# Patient Record
Sex: MALE | Race: WHITE | Employment: UNEMPLOYED | URBAN - METROPOLITAN AREA
[De-identification: names, ages, dates, MRNs, and addresses within clinical notes are randomized per-mention and may not be internally consistent; named-entity substitution may affect disease eponyms.]

---

## 2019-06-21 ENCOUNTER — HOSPITAL ENCOUNTER (EMERGENCY)
Age: 34
Discharge: HOME | End: 2019-06-21
Attending: EMERGENCY MEDICINE
Payer: MEDICARE

## 2019-06-21 ENCOUNTER — APPOINTMENT (OUTPATIENT)
Dept: GENERAL RADIOLOGY | Age: 34
End: 2019-06-21
Payer: MEDICARE

## 2019-06-21 VITALS
HEART RATE: 91 BPM | HEIGHT: 69 IN | OXYGEN SATURATION: 98 % | BODY MASS INDEX: 30.51 KG/M2 | WEIGHT: 206 LBS | RESPIRATION RATE: 16 BRPM | TEMPERATURE: 98.2 F | SYSTOLIC BLOOD PRESSURE: 144 MMHG | DIASTOLIC BLOOD PRESSURE: 94 MMHG

## 2019-06-21 DIAGNOSIS — R42 LIGHTHEADEDNESS: Primary | ICD-10-CM

## 2019-06-21 PROCEDURE — 99283 EMERGENCY DEPT VISIT LOW MDM: CPT

## 2019-06-21 PROCEDURE — 71046 X-RAY EXAM CHEST 2 VIEWS: CPT

## 2019-06-21 ASSESSMENT — ENCOUNTER SYMPTOMS
COLOR CHANGE: 0
VOMITING: 0
WHEEZING: 0
NAUSEA: 0
RHINORRHEA: 0
SORE THROAT: 0
SHORTNESS OF BREATH: 0
DIARRHEA: 0
ABDOMINAL PAIN: 0
CONSTIPATION: 0
COUGH: 0
SINUS PRESSURE: 0

## 2019-06-21 ASSESSMENT — PAIN DESCRIPTION - PAIN TYPE: TYPE: ACUTE PAIN

## 2019-06-21 ASSESSMENT — PAIN SCALES - GENERAL: PAINLEVEL_OUTOF10: 7

## 2019-06-21 ASSESSMENT — PAIN DESCRIPTION - LOCATION: LOCATION: HEAD
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
## 2020-12-17 ENCOUNTER — HOSPITAL ENCOUNTER (EMERGENCY)
Facility: CLINIC | Age: 35
Discharge: HOME | End: 2020-12-17
Attending: EMERGENCY MEDICINE
Payer: COMMERCIAL

## 2020-12-17 VITALS
WEIGHT: 190 LBS | HEIGHT: 69 IN | DIASTOLIC BLOOD PRESSURE: 86 MMHG | OXYGEN SATURATION: 97 % | TEMPERATURE: 98.5 F | RESPIRATION RATE: 16 BRPM | SYSTOLIC BLOOD PRESSURE: 157 MMHG | HEART RATE: 100 BPM | BODY MASS INDEX: 28.14 KG/M2

## 2020-12-17 PROCEDURE — 99285 EMERGENCY DEPT VISIT HI MDM: CPT

## 2020-12-17 PROCEDURE — 6370000000 HC RX 637 (ALT 250 FOR IP): Performed by: EMERGENCY MEDICINE

## 2020-12-17 RX ORDER — ONDANSETRON 4 MG/1
4 TABLET, ORALLY DISINTEGRATING ORAL EVERY 8 HOURS PRN
Qty: 6 TABLET | Refills: 0 | Status: ON HOLD | OUTPATIENT
Start: 2020-12-17 | End: 2021-12-21 | Stop reason: SDUPTHER

## 2020-12-17 RX ORDER — ONDANSETRON 4 MG/1
4 TABLET, ORALLY DISINTEGRATING ORAL ONCE
Status: COMPLETED | OUTPATIENT
Start: 2020-12-17 | End: 2020-12-17

## 2020-12-17 RX ADMIN — ONDANSETRON 4 MG: 4 TABLET, ORALLY DISINTEGRATING ORAL at 23:14
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
## 2021-10-27 ENCOUNTER — HOSPITAL ENCOUNTER (EMERGENCY)
Facility: CLINIC | Age: 36
Discharge: LEFT BEFORE DISCHARGE | End: 2021-10-28
Attending: EMERGENCY MEDICINE
Payer: COMMERCIAL

## 2021-10-27 VITALS
HEIGHT: 68 IN | WEIGHT: 17 LBS | RESPIRATION RATE: 16 BRPM | OXYGEN SATURATION: 95 % | BODY MASS INDEX: 2.58 KG/M2 | HEART RATE: 125 BPM | SYSTOLIC BLOOD PRESSURE: 130 MMHG | DIASTOLIC BLOOD PRESSURE: 82 MMHG

## 2021-10-27 DIAGNOSIS — L03.119 CELLULITIS AND ABSCESS OF LEG: Primary | ICD-10-CM

## 2021-10-27 DIAGNOSIS — L02.419 CELLULITIS AND ABSCESS OF LEG: Primary | ICD-10-CM

## 2021-10-27 PROCEDURE — 83605 ASSAY OF LACTIC ACID: CPT

## 2021-10-27 PROCEDURE — 84484 ASSAY OF TROPONIN QUANT: CPT

## 2021-10-27 PROCEDURE — 83880 ASSAY OF NATRIURETIC PEPTIDE: CPT

## 2021-10-27 PROCEDURE — 99285 EMERGENCY DEPT VISIT HI MDM: CPT

## 2021-10-27 PROCEDURE — 83690 ASSAY OF LIPASE: CPT

## 2021-10-27 PROCEDURE — 80053 COMPREHEN METABOLIC PANEL: CPT

## 2021-10-27 PROCEDURE — 85025 COMPLETE CBC W/AUTO DIFF WBC: CPT

## 2021-10-27 PROCEDURE — 83735 ASSAY OF MAGNESIUM: CPT

## 2021-10-27 PROCEDURE — 36415 COLL VENOUS BLD VENIPUNCTURE: CPT

## 2021-10-27 PROCEDURE — 87040 BLOOD CULTURE FOR BACTERIA: CPT

## 2021-10-27 RX ORDER — METHADONE HYDROCHLORIDE 5 MG/5ML
100 SOLUTION ORAL DAILY
Status: ON HOLD | COMMUNITY
End: 2021-12-17 | Stop reason: ALTCHOICE

## 2021-10-27 RX ORDER — 0.9 % SODIUM CHLORIDE 0.9 %
1000 INTRAVENOUS SOLUTION INTRAVENOUS ONCE
Status: COMPLETED | OUTPATIENT
Start: 2021-10-28 | End: 2021-10-28

## 2021-10-27 RX ORDER — METHADONE HYDROCHLORIDE 40 MG/1
100 TABLET ORAL EVERY 4 HOURS PRN
Status: ON HOLD | COMMUNITY
End: 2021-12-17

## 2021-10-27 ASSESSMENT — PAIN DESCRIPTION - LOCATION: LOCATION: LEG

## 2021-10-27 ASSESSMENT — PAIN DESCRIPTION - DESCRIPTORS: DESCRIPTORS: ACHING

## 2021-10-27 ASSESSMENT — PAIN DESCRIPTION - FREQUENCY: FREQUENCY: CONTINUOUS

## 2021-10-27 ASSESSMENT — PAIN DESCRIPTION - ORIENTATION: ORIENTATION: LEFT

## 2021-10-27 ASSESSMENT — PAIN SCALES - GENERAL: PAINLEVEL_OUTOF10: 8

## 2021-10-28 ENCOUNTER — APPOINTMENT (OUTPATIENT)
Dept: CT IMAGING | Facility: CLINIC | Age: 36
End: 2021-10-28
Payer: COMMERCIAL

## 2021-10-28 LAB
ABSOLUTE EOS #: 0.3 K/UL (ref 0–0.4)
ABSOLUTE IMMATURE GRANULOCYTE: ABNORMAL K/UL (ref 0–0.3)
ABSOLUTE LYMPH #: 1.7 K/UL (ref 1–4.8)
ABSOLUTE MONO #: 0.7 K/UL (ref 0.1–1.2)
ALBUMIN SERPL-MCNC: 3.4 G/DL (ref 3.5–5.2)
ALBUMIN/GLOBULIN RATIO: 0.7 (ref 1–2.5)
ALP BLD-CCNC: 96 U/L (ref 40–129)
ALT SERPL-CCNC: 10 U/L (ref 5–41)
ANION GAP SERPL CALCULATED.3IONS-SCNC: 10 MMOL/L (ref 9–17)
AST SERPL-CCNC: 18 U/L
BASOPHILS # BLD: 2 % (ref 0–2)
BASOPHILS ABSOLUTE: 0.1 K/UL (ref 0–0.2)
BILIRUB SERPL-MCNC: 0.4 MG/DL (ref 0.3–1.2)
BNP INTERPRETATION: ABNORMAL
BUN BLDV-MCNC: 11 MG/DL (ref 6–20)
BUN/CREAT BLD: ABNORMAL (ref 9–20)
CALCIUM SERPL-MCNC: 8.7 MG/DL (ref 8.6–10.4)
CHLORIDE BLD-SCNC: 97 MMOL/L (ref 98–107)
CO2: 31 MMOL/L (ref 20–31)
CREAT SERPL-MCNC: 0.8 MG/DL (ref 0.7–1.2)
DIFFERENTIAL TYPE: ABNORMAL
EOSINOPHILS RELATIVE PERCENT: 4 % (ref 1–4)
GFR AFRICAN AMERICAN: >60 ML/MIN
GFR NON-AFRICAN AMERICAN: >60 ML/MIN
GFR SERPL CREATININE-BSD FRML MDRD: ABNORMAL ML/MIN/{1.73_M2}
GFR SERPL CREATININE-BSD FRML MDRD: ABNORMAL ML/MIN/{1.73_M2}
GLUCOSE BLD-MCNC: 119 MG/DL (ref 70–99)
HCT VFR BLD CALC: 26.2 % (ref 41–53)
HEMOGLOBIN: 8.1 G/DL (ref 13.5–17.5)
IMMATURE GRANULOCYTES: ABNORMAL %
LACTIC ACID, SEPSIS WHOLE BLOOD: NORMAL MMOL/L (ref 0.5–1.9)
LACTIC ACID, SEPSIS: 1 MMOL/L (ref 0.5–1.9)
LIPASE: 17 U/L (ref 13–60)
LYMPHOCYTES # BLD: 23 % (ref 24–44)
MAGNESIUM: 1.9 MG/DL (ref 1.6–2.6)
MCH RBC QN AUTO: 23.1 PG (ref 26–34)
MCHC RBC AUTO-ENTMCNC: 31.1 G/DL (ref 31–37)
MCV RBC AUTO: 74.3 FL (ref 80–100)
MONOCYTES # BLD: 9 % (ref 2–11)
NRBC AUTOMATED: ABNORMAL PER 100 WBC
PDW BLD-RTO: 19.3 % (ref 12.5–15.4)
PLATELET # BLD: 289 K/UL (ref 140–450)
PLATELET ESTIMATE: ABNORMAL
PMV BLD AUTO: 6.3 FL (ref 6–12)
POTASSIUM SERPL-SCNC: 4.2 MMOL/L (ref 3.7–5.3)
PRO-BNP: 1930 PG/ML
RBC # BLD: 3.52 M/UL (ref 4.5–5.9)
RBC # BLD: ABNORMAL 10*6/UL
SEG NEUTROPHILS: 62 % (ref 36–66)
SEGMENTED NEUTROPHILS ABSOLUTE COUNT: 4.6 K/UL (ref 1.8–7.7)
SODIUM BLD-SCNC: 138 MMOL/L (ref 135–144)
TOTAL PROTEIN: 8.2 G/DL (ref 6.4–8.3)
TROPONIN INTERP: ABNORMAL
TROPONIN T: ABNORMAL NG/ML
TROPONIN, HIGH SENSITIVITY: 37 NG/L (ref 0–22)
WBC # BLD: 7.4 K/UL (ref 3.5–11)
WBC # BLD: ABNORMAL 10*3/UL

## 2021-10-28 PROCEDURE — 2580000003 HC RX 258: Performed by: EMERGENCY MEDICINE

## 2021-10-28 PROCEDURE — 93005 ELECTROCARDIOGRAM TRACING: CPT | Performed by: EMERGENCY MEDICINE

## 2021-10-28 RX ORDER — 0.9 % SODIUM CHLORIDE 0.9 %
80 INTRAVENOUS SOLUTION INTRAVENOUS ONCE
Status: DISCONTINUED | OUTPATIENT
Start: 2021-10-28 | End: 2021-10-28 | Stop reason: HOSPADM

## 2021-10-28 RX ORDER — BUMETANIDE 1 MG/1
1 TABLET ORAL DAILY
Status: ON HOLD | COMMUNITY
End: 2021-12-21 | Stop reason: HOSPADM

## 2021-10-28 RX ORDER — SODIUM CHLORIDE 0.9 % (FLUSH) 0.9 %
10 SYRINGE (ML) INJECTION PRN
Status: DISCONTINUED | OUTPATIENT
Start: 2021-10-28 | End: 2021-10-28 | Stop reason: HOSPADM

## 2021-10-28 RX ADMIN — SODIUM CHLORIDE 1000 ML: 9 INJECTION, SOLUTION INTRAVENOUS at 00:19

## 2021-10-28 ASSESSMENT — ENCOUNTER SYMPTOMS
NAUSEA: 0
DIARRHEA: 0
SHORTNESS OF BREATH: 0
SORE THROAT: 0
ABDOMINAL PAIN: 0
VOMITING: 0

## 2021-10-28 NOTE — ED NOTES
Blood cultures drawn with IV start and sent to lab     Diana Leung, [facility]  10/28/21 5449
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
## 2021-10-28 NOTE — ED NOTES
Pt presents to the ED via private auto. Pt states his left leg has been swollen more than usual x1 week. Pt stated his left leg was painful for a few days but no longer hurts.  Pt states he normally is short of breath but he noticed he is more short of breath with exertion lately        Jacklyn Diaz, RN  10/28/21 79 Jonathon Parker RN  10/28/21 0030

## 2021-10-30 LAB
EKG ATRIAL RATE: 107 BPM
EKG P AXIS: 45 DEGREES
EKG P-R INTERVAL: 150 MS
EKG Q-T INTERVAL: 374 MS
EKG QRS DURATION: 90 MS
EKG QTC CALCULATION (BAZETT): 499 MS
EKG R AXIS: 4 DEGREES
EKG T AXIS: -30 DEGREES
EKG VENTRICULAR RATE: 107 BPM

## 2021-11-03 LAB
CULTURE: NORMAL
Lab: NORMAL
SPECIMEN DESCRIPTION: NORMAL

## 2021-12-16 ENCOUNTER — HOSPITAL ENCOUNTER (INPATIENT)
Age: 36
LOS: 5 days | Discharge: HOME | DRG: 317 | End: 2021-12-21
Attending: EMERGENCY MEDICINE | Admitting: INTERNAL MEDICINE
Payer: COMMERCIAL

## 2021-12-16 ENCOUNTER — APPOINTMENT (OUTPATIENT)
Dept: GENERAL RADIOLOGY | Age: 36
DRG: 317 | End: 2021-12-16
Payer: COMMERCIAL

## 2021-12-16 DIAGNOSIS — M86.272 SUBACUTE OSTEOMYELITIS OF LEFT FOOT (HCC): Primary | ICD-10-CM

## 2021-12-16 DIAGNOSIS — M86.9 OSTEOMYELITIS OF LEFT FOOT, UNSPECIFIED TYPE (HCC): ICD-10-CM

## 2021-12-16 PROBLEM — F11.20 METHADONE DEPENDENCE (HCC): Status: ACTIVE | Noted: 2021-12-16

## 2021-12-16 PROBLEM — I26.99 PULMONARY EMBOLISM (HCC): Status: ACTIVE | Noted: 2021-12-16

## 2021-12-16 LAB
ABSOLUTE EOS #: 0.18 K/UL (ref 0–0.44)
ABSOLUTE IMMATURE GRANULOCYTE: <0.03 K/UL (ref 0–0.3)
ABSOLUTE LYMPH #: 1.4 K/UL (ref 1.1–3.7)
ABSOLUTE MONO #: 0.42 K/UL (ref 0.1–1.2)
ALBUMIN SERPL-MCNC: 3.6 G/DL (ref 3.5–5.2)
ALBUMIN/GLOBULIN RATIO: 0.9 (ref 1–2.5)
ALP BLD-CCNC: 105 U/L (ref 40–129)
ALT SERPL-CCNC: 28 U/L (ref 5–41)
ANION GAP SERPL CALCULATED.3IONS-SCNC: 9 MMOL/L (ref 9–17)
AST SERPL-CCNC: 30 U/L
BASOPHILS # BLD: 1 % (ref 0–2)
BASOPHILS ABSOLUTE: 0.05 K/UL (ref 0–0.2)
BILIRUB SERPL-MCNC: 0.51 MG/DL (ref 0.3–1.2)
BNP INTERPRETATION: ABNORMAL
BUN BLDV-MCNC: 8 MG/DL (ref 6–20)
BUN/CREAT BLD: ABNORMAL (ref 9–20)
C-REACTIVE PROTEIN: 16 MG/L (ref 0–5)
CALCIUM SERPL-MCNC: 9.1 MG/DL (ref 8.6–10.4)
CHLORIDE BLD-SCNC: 100 MMOL/L (ref 98–107)
CO2: 27 MMOL/L (ref 20–31)
CREAT SERPL-MCNC: 0.49 MG/DL (ref 0.7–1.2)
DIFFERENTIAL TYPE: ABNORMAL
EOSINOPHILS RELATIVE PERCENT: 3 % (ref 1–4)
GFR AFRICAN AMERICAN: >60 ML/MIN
GFR NON-AFRICAN AMERICAN: >60 ML/MIN
GFR SERPL CREATININE-BSD FRML MDRD: ABNORMAL ML/MIN/{1.73_M2}
GFR SERPL CREATININE-BSD FRML MDRD: ABNORMAL ML/MIN/{1.73_M2}
GLUCOSE BLD-MCNC: 100 MG/DL (ref 70–99)
HCT VFR BLD CALC: 37 % (ref 40.7–50.3)
HEMOGLOBIN: 10.5 G/DL (ref 13–17)
IMMATURE GRANULOCYTES: 0 %
INR BLD: 1.1
LACTIC ACID, WHOLE BLOOD: 1.2 MMOL/L (ref 0.7–2.1)
LACTIC ACID: NORMAL MMOL/L
LYMPHOCYTES # BLD: 26 % (ref 24–43)
MCH RBC QN AUTO: 20.1 PG (ref 25.2–33.5)
MCHC RBC AUTO-ENTMCNC: 28.4 G/DL (ref 28.4–34.8)
MCV RBC AUTO: 70.9 FL (ref 82.6–102.9)
MONOCYTES # BLD: 8 % (ref 3–12)
NRBC AUTOMATED: 0 PER 100 WBC
PARTIAL THROMBOPLASTIN TIME: 24.1 SEC (ref 20.5–30.5)
PDW BLD-RTO: 16.7 % (ref 11.8–14.4)
PLATELET # BLD: 210 K/UL (ref 138–453)
PLATELET ESTIMATE: ABNORMAL
PMV BLD AUTO: 8.9 FL (ref 8.1–13.5)
POTASSIUM SERPL-SCNC: 4.2 MMOL/L (ref 3.7–5.3)
PRO-BNP: 400 PG/ML
PROTHROMBIN TIME: 11.3 SEC (ref 9.1–12.3)
RBC # BLD: 5.22 M/UL (ref 4.21–5.77)
RBC # BLD: ABNORMAL 10*6/UL
SARS-COV-2, RAPID: NOT DETECTED
SEDIMENTATION RATE, ERYTHROCYTE: 31 MM (ref 0–15)
SEG NEUTROPHILS: 62 % (ref 36–65)
SEGMENTED NEUTROPHILS ABSOLUTE COUNT: 3.31 K/UL (ref 1.5–8.1)
SODIUM BLD-SCNC: 136 MMOL/L (ref 135–144)
SPECIMEN DESCRIPTION: NORMAL
TOTAL PROTEIN: 7.6 G/DL (ref 6.4–8.3)
TROPONIN INTERP: NORMAL
TROPONIN INTERP: NORMAL
TROPONIN T: NORMAL NG/ML
TROPONIN T: NORMAL NG/ML
TROPONIN, HIGH SENSITIVITY: 6 NG/L (ref 0–22)
TROPONIN, HIGH SENSITIVITY: 8 NG/L (ref 0–22)
WBC # BLD: 5.4 K/UL (ref 3.5–11.3)
WBC # BLD: ABNORMAL 10*3/UL

## 2021-12-16 PROCEDURE — 6360000002 HC RX W HCPCS: Performed by: STUDENT IN AN ORGANIZED HEALTH CARE EDUCATION/TRAINING PROGRAM

## 2021-12-16 PROCEDURE — 6370000000 HC RX 637 (ALT 250 FOR IP): Performed by: NURSE PRACTITIONER

## 2021-12-16 PROCEDURE — 80053 COMPREHEN METABOLIC PANEL: CPT

## 2021-12-16 PROCEDURE — 1200000000 HC SEMI PRIVATE

## 2021-12-16 PROCEDURE — 93005 ELECTROCARDIOGRAM TRACING: CPT | Performed by: STUDENT IN AN ORGANIZED HEALTH CARE EDUCATION/TRAINING PROGRAM

## 2021-12-16 PROCEDURE — 73610 X-RAY EXAM OF ANKLE: CPT

## 2021-12-16 PROCEDURE — 73630 X-RAY EXAM OF FOOT: CPT

## 2021-12-16 PROCEDURE — 83605 ASSAY OF LACTIC ACID: CPT

## 2021-12-16 PROCEDURE — 85652 RBC SED RATE AUTOMATED: CPT

## 2021-12-16 PROCEDURE — 85025 COMPLETE CBC W/AUTO DIFF WBC: CPT

## 2021-12-16 PROCEDURE — 99222 1ST HOSP IP/OBS MODERATE 55: CPT | Performed by: NURSE PRACTITIONER

## 2021-12-16 PROCEDURE — 73590 X-RAY EXAM OF LOWER LEG: CPT

## 2021-12-16 PROCEDURE — 71045 X-RAY EXAM CHEST 1 VIEW: CPT

## 2021-12-16 PROCEDURE — 86140 C-REACTIVE PROTEIN: CPT

## 2021-12-16 PROCEDURE — 87635 SARS-COV-2 COVID-19 AMP PRB: CPT

## 2021-12-16 PROCEDURE — 87040 BLOOD CULTURE FOR BACTERIA: CPT

## 2021-12-16 PROCEDURE — 2500000003 HC RX 250 WO HCPCS: Performed by: NURSE PRACTITIONER

## 2021-12-16 PROCEDURE — 96374 THER/PROPH/DIAG INJ IV PUSH: CPT

## 2021-12-16 PROCEDURE — 2580000003 HC RX 258: Performed by: NURSE PRACTITIONER

## 2021-12-16 PROCEDURE — 6360000002 HC RX W HCPCS: Performed by: NURSE PRACTITIONER

## 2021-12-16 PROCEDURE — 85730 THROMBOPLASTIN TIME PARTIAL: CPT

## 2021-12-16 PROCEDURE — 83880 ASSAY OF NATRIURETIC PEPTIDE: CPT

## 2021-12-16 PROCEDURE — 85610 PROTHROMBIN TIME: CPT

## 2021-12-16 PROCEDURE — 84484 ASSAY OF TROPONIN QUANT: CPT

## 2021-12-16 PROCEDURE — 99283 EMERGENCY DEPT VISIT LOW MDM: CPT

## 2021-12-16 PROCEDURE — 36415 COLL VENOUS BLD VENIPUNCTURE: CPT

## 2021-12-16 RX ORDER — BACLOFEN 10 MG/1
20 TABLET ORAL 3 TIMES DAILY
Status: DISCONTINUED | OUTPATIENT
Start: 2021-12-16 | End: 2021-12-21 | Stop reason: HOSPADM

## 2021-12-16 RX ORDER — SODIUM CHLORIDE 0.9 % (FLUSH) 0.9 %
10 SYRINGE (ML) INJECTION PRN
Status: DISCONTINUED | OUTPATIENT
Start: 2021-12-16 | End: 2021-12-21 | Stop reason: HOSPADM

## 2021-12-16 RX ORDER — ACETAMINOPHEN 325 MG/1
650 TABLET ORAL EVERY 6 HOURS PRN
Status: DISCONTINUED | OUTPATIENT
Start: 2021-12-16 | End: 2021-12-21 | Stop reason: HOSPADM

## 2021-12-16 RX ORDER — POLYETHYLENE GLYCOL 3350 17 G/17G
17 POWDER, FOR SOLUTION ORAL DAILY PRN
Status: DISCONTINUED | OUTPATIENT
Start: 2021-12-16 | End: 2021-12-21 | Stop reason: HOSPADM

## 2021-12-16 RX ORDER — GABAPENTIN 800 MG/1
800 TABLET ORAL 3 TIMES DAILY
Status: DISCONTINUED | OUTPATIENT
Start: 2021-12-17 | End: 2021-12-21 | Stop reason: HOSPADM

## 2021-12-16 RX ORDER — POTASSIUM CHLORIDE 20 MEQ/1
40 TABLET, EXTENDED RELEASE ORAL PRN
Status: DISCONTINUED | OUTPATIENT
Start: 2021-12-16 | End: 2021-12-21 | Stop reason: HOSPADM

## 2021-12-16 RX ORDER — POTASSIUM CHLORIDE 7.45 MG/ML
10 INJECTION INTRAVENOUS PRN
Status: DISCONTINUED | OUTPATIENT
Start: 2021-12-16 | End: 2021-12-21 | Stop reason: HOSPADM

## 2021-12-16 RX ORDER — KETOROLAC TROMETHAMINE 30 MG/ML
30 INJECTION, SOLUTION INTRAMUSCULAR; INTRAVENOUS EVERY 6 HOURS PRN
Status: DISCONTINUED | OUTPATIENT
Start: 2021-12-17 | End: 2021-12-17

## 2021-12-16 RX ORDER — MAGNESIUM SULFATE 1 G/100ML
1000 INJECTION INTRAVENOUS PRN
Status: DISCONTINUED | OUTPATIENT
Start: 2021-12-16 | End: 2021-12-21 | Stop reason: HOSPADM

## 2021-12-16 RX ORDER — GABAPENTIN 800 MG/1
400 TABLET ORAL 3 TIMES DAILY
Status: DISCONTINUED | OUTPATIENT
Start: 2021-12-16 | End: 2021-12-16

## 2021-12-16 RX ORDER — ACETAMINOPHEN 650 MG/1
650 SUPPOSITORY RECTAL EVERY 6 HOURS PRN
Status: DISCONTINUED | OUTPATIENT
Start: 2021-12-16 | End: 2021-12-21 | Stop reason: HOSPADM

## 2021-12-16 RX ORDER — GABAPENTIN 400 MG/1
400 CAPSULE ORAL ONCE
Status: COMPLETED | OUTPATIENT
Start: 2021-12-16 | End: 2021-12-16

## 2021-12-16 RX ORDER — SODIUM CHLORIDE 0.9 % (FLUSH) 0.9 %
5-40 SYRINGE (ML) INJECTION EVERY 12 HOURS SCHEDULED
Status: DISCONTINUED | OUTPATIENT
Start: 2021-12-16 | End: 2021-12-21 | Stop reason: HOSPADM

## 2021-12-16 RX ORDER — SODIUM CHLORIDE 9 MG/ML
25 INJECTION, SOLUTION INTRAVENOUS PRN
Status: DISCONTINUED | OUTPATIENT
Start: 2021-12-16 | End: 2021-12-21 | Stop reason: HOSPADM

## 2021-12-16 RX ORDER — ONDANSETRON 2 MG/ML
4 INJECTION INTRAMUSCULAR; INTRAVENOUS EVERY 6 HOURS PRN
Status: DISCONTINUED | OUTPATIENT
Start: 2021-12-16 | End: 2021-12-21 | Stop reason: HOSPADM

## 2021-12-16 RX ORDER — KETOROLAC TROMETHAMINE 30 MG/ML
30 INJECTION, SOLUTION INTRAMUSCULAR; INTRAVENOUS ONCE
Status: COMPLETED | OUTPATIENT
Start: 2021-12-16 | End: 2021-12-16

## 2021-12-16 RX ORDER — ONDANSETRON 4 MG/1
4 TABLET, ORALLY DISINTEGRATING ORAL EVERY 8 HOURS PRN
Status: DISCONTINUED | OUTPATIENT
Start: 2021-12-16 | End: 2021-12-21 | Stop reason: HOSPADM

## 2021-12-16 RX ADMIN — BACLOFEN 20 MG: 10 TABLET ORAL at 23:27

## 2021-12-16 RX ADMIN — FAMOTIDINE 20 MG: 10 INJECTION INTRAVENOUS at 21:13

## 2021-12-16 RX ADMIN — GABAPENTIN 400 MG: 800 TABLET ORAL at 21:13

## 2021-12-16 RX ADMIN — KETOROLAC TROMETHAMINE 30 MG: 30 INJECTION, SOLUTION INTRAMUSCULAR; INTRAVENOUS at 18:25

## 2021-12-16 RX ADMIN — GABAPENTIN 400 MG: 400 CAPSULE ORAL at 23:27

## 2021-12-16 RX ADMIN — SODIUM CHLORIDE, PRESERVATIVE FREE 10 ML: 5 INJECTION INTRAVENOUS at 21:17

## 2021-12-16 RX ADMIN — Medication 1250 MG: at 17:51

## 2021-12-16 ASSESSMENT — ENCOUNTER SYMPTOMS
COUGH: 0
NAUSEA: 0
ABDOMINAL PAIN: 0
VOMITING: 0
PHOTOPHOBIA: 0
BACK PAIN: 0
DIARRHEA: 0
SHORTNESS OF BREATH: 0
WHEEZING: 0
SORE THROAT: 0
CONSTIPATION: 0
SINUS PAIN: 0

## 2021-12-16 ASSESSMENT — PAIN DESCRIPTION - DESCRIPTORS
DESCRIPTORS: THROBBING
DESCRIPTORS: THROBBING;CONSTANT

## 2021-12-16 ASSESSMENT — PAIN SCALES - GENERAL
PAINLEVEL_OUTOF10: 8
PAINLEVEL_OUTOF10: 8
PAINLEVEL_OUTOF10: 0
PAINLEVEL_OUTOF10: 10

## 2021-12-16 ASSESSMENT — PAIN DESCRIPTION - FREQUENCY
FREQUENCY: CONTINUOUS
FREQUENCY: INTERMITTENT

## 2021-12-16 ASSESSMENT — PAIN DESCRIPTION - PAIN TYPE: TYPE: ACUTE PAIN

## 2021-12-16 ASSESSMENT — PAIN DESCRIPTION - ORIENTATION
ORIENTATION: LEFT
ORIENTATION: LEFT

## 2021-12-16 ASSESSMENT — PAIN DESCRIPTION - LOCATION
LOCATION: FOOT
LOCATION: LEG

## 2021-12-16 NOTE — ED PROVIDER NOTES
[address] ED  Faculty Attestation    I performed a history and physical examination of the patient and discussed management with the resident. I reviewed the residents note and agree with the documented findings and plan of care. Any areas of disagreement are noted on the chart. I was personally present for the key portions of any procedures. I have documented in the chart those procedures where I was not present during the key portions. I have reviewed the emergency nurses triage note. I agree with the chief complaint, past medical history, past surgical history, allergies, medications, social, and family history as documented unless otherwise noted below.        This is a 79-year-old male brought to the emergency department for medical clearance for FDC  Of note patient was at [facility] yesterday  Review of records indicate that he was diagnosed with a left lower extremity cellulitis, a left lower extremity abscess, and pulmonary emboli  Patient reportedly does have a history of emboli but is not on anticoagulation currently  Has had recent fatigue, intermittent nausea, and shortness of breath  He is not actively shortness of breath or dyspneic  No cough or wheezing  Denies fever  No chest pain, palpitations, or near syncope  No abdominal discomfort  No genitourinary or stool complaints  Review of systems otherwise negative  He has no additional concerns    On examination he appears in no acute distress  Heart is regular in rate and rhythm  Systolic murmur present (patient post valve replacement)  Lungs clear to auscultation  Normal work of breathing  No retractions  Abdomen soft and nontender  Normal peripheral perfusion and skin turgor  Patient does have bilateral lower extremity edema that is worse on the left  Patient with some questionable fluctuance to the left posterior lower leg  No open wound  No bruising  Normal peripheral perfusion and sensation    Charts, labs, and imaging from [facility] reviewed    Will start IV antibiotics, repeat labs, and plan for admission    Imaging reviewed  Stable vital signs  We will plan for admission  We will discuss anticoagulation with admitting team      Racehl Sahu DO  Attending Emergency Physician        Clearance DO Cece  12/16/21 [id number]
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
## 2021-12-16 NOTE — ED NOTES
Pt was seen at [address] [facility] 12/15/21 for blood clots in left leg  Pt left AMA from [address] [facility]   Pt states he had heart  valve replacement in July 2021  Pt has a history of drug abuse  Pt states he used cocaine 3 days ago     Ap Ramirez, LPN  31/85/07 [facility], [facility]  56/43/08 [id number]

## 2021-12-16 NOTE — ED NOTES
Bed: 44  Expected date:   Expected time:   Means of arrival:   Comments:     Mortimer Knee, RN  12/16/21 [id number]

## 2021-12-17 ENCOUNTER — APPOINTMENT (OUTPATIENT)
Dept: MRI IMAGING | Age: 36
DRG: 317 | End: 2021-12-17
Payer: COMMERCIAL

## 2021-12-17 PROBLEM — D50.9 MICROCYTIC ANEMIA: Status: ACTIVE | Noted: 2021-12-17

## 2021-12-17 LAB
-: ABNORMAL
AMORPHOUS: ABNORMAL
ANION GAP SERPL CALCULATED.3IONS-SCNC: 9 MMOL/L (ref 9–17)
BACTERIA: ABNORMAL
BILIRUBIN URINE: NEGATIVE
BUN BLDV-MCNC: 10 MG/DL (ref 6–20)
BUN/CREAT BLD: ABNORMAL (ref 9–20)
CALCIUM SERPL-MCNC: 8.7 MG/DL (ref 8.6–10.4)
CASTS UA: ABNORMAL /LPF (ref 0–8)
CHLORIDE BLD-SCNC: 103 MMOL/L (ref 98–107)
CO2: 26 MMOL/L (ref 20–31)
COLOR: YELLOW
CREAT SERPL-MCNC: 0.58 MG/DL (ref 0.7–1.2)
CRYSTALS, UA: ABNORMAL /HPF
EKG ATRIAL RATE: 93 BPM
EKG ATRIAL RATE: 95 BPM
EKG P AXIS: 65 DEGREES
EKG P AXIS: 67 DEGREES
EKG P-R INTERVAL: 152 MS
EKG P-R INTERVAL: 154 MS
EKG Q-T INTERVAL: 378 MS
EKG Q-T INTERVAL: 384 MS
EKG QRS DURATION: 92 MS
EKG QRS DURATION: 92 MS
EKG QTC CALCULATION (BAZETT): 475 MS
EKG QTC CALCULATION (BAZETT): 477 MS
EKG R AXIS: 19 DEGREES
EKG R AXIS: 23 DEGREES
EKG T AXIS: 44 DEGREES
EKG T AXIS: 45 DEGREES
EKG VENTRICULAR RATE: 93 BPM
EKG VENTRICULAR RATE: 95 BPM
EPITHELIAL CELLS UA: ABNORMAL /HPF (ref 0–5)
FERRITIN: 96 UG/L (ref 30–400)
FOLATE: 14 NG/ML
GFR AFRICAN AMERICAN: >60 ML/MIN
GFR NON-AFRICAN AMERICAN: >60 ML/MIN
GFR SERPL CREATININE-BSD FRML MDRD: ABNORMAL ML/MIN/{1.73_M2}
GFR SERPL CREATININE-BSD FRML MDRD: ABNORMAL ML/MIN/{1.73_M2}
GLUCOSE BLD-MCNC: 101 MG/DL (ref 70–99)
GLUCOSE URINE: NEGATIVE
HCT VFR BLD CALC: 36.7 % (ref 40.7–50.3)
HEMOGLOBIN: 10.8 G/DL (ref 13–17)
IRON SATURATION: 9 % (ref 20–55)
IRON: 24 UG/DL (ref 59–158)
KETONES, URINE: NEGATIVE
LEUKOCYTE ESTERASE, URINE: NEGATIVE
MCH RBC QN AUTO: 20.8 PG (ref 25.2–33.5)
MCHC RBC AUTO-ENTMCNC: 29.4 G/DL (ref 28.4–34.8)
MCV RBC AUTO: 70.6 FL (ref 82.6–102.9)
MUCUS: ABNORMAL
NITRITE, URINE: NEGATIVE
NRBC AUTOMATED: 0 PER 100 WBC
OTHER OBSERVATIONS UA: ABNORMAL
PDW BLD-RTO: 16.8 % (ref 11.8–14.4)
PH UA: 8.5 (ref 5–8)
PLATELET # BLD: 199 K/UL (ref 138–453)
PMV BLD AUTO: 8.6 FL (ref 8.1–13.5)
POTASSIUM SERPL-SCNC: 4.3 MMOL/L (ref 3.7–5.3)
PROTEIN UA: NEGATIVE
RBC # BLD: 5.2 M/UL (ref 4.21–5.77)
RBC UA: ABNORMAL /HPF (ref 0–4)
RENAL EPITHELIAL, UA: ABNORMAL /HPF
SODIUM BLD-SCNC: 138 MMOL/L (ref 135–144)
SPECIFIC GRAVITY UA: 1.01 (ref 1–1.03)
TOTAL IRON BINDING CAPACITY: 280 UG/DL (ref 250–450)
TRICHOMONAS: ABNORMAL
TURBIDITY: CLEAR
UNSATURATED IRON BINDING CAPACITY: 256 UG/DL (ref 112–347)
URINE HGB: NEGATIVE
UROBILINOGEN, URINE: NORMAL
VITAMIN B-12: 476 PG/ML (ref 232–1245)
WBC # BLD: 5.5 K/UL (ref 3.5–11.3)
WBC UA: ABNORMAL /HPF (ref 0–5)
YEAST: ABNORMAL

## 2021-12-17 PROCEDURE — APPSS30 APP SPLIT SHARED TIME 16-30 MINUTES: Performed by: NURSE PRACTITIONER

## 2021-12-17 PROCEDURE — 73720 MRI LWR EXTREMITY W/O&W/DYE: CPT

## 2021-12-17 PROCEDURE — 99254 IP/OBS CNSLTJ NEW/EST MOD 60: CPT | Performed by: INTERNAL MEDICINE

## 2021-12-17 PROCEDURE — 36415 COLL VENOUS BLD VENIPUNCTURE: CPT

## 2021-12-17 PROCEDURE — 80048 BASIC METABOLIC PNL TOTAL CA: CPT

## 2021-12-17 PROCEDURE — 82746 ASSAY OF FOLIC ACID SERUM: CPT

## 2021-12-17 PROCEDURE — 2500000003 HC RX 250 WO HCPCS: Performed by: NURSE PRACTITIONER

## 2021-12-17 PROCEDURE — 83540 ASSAY OF IRON: CPT

## 2021-12-17 PROCEDURE — 6360000004 HC RX CONTRAST MEDICATION: Performed by: NURSE PRACTITIONER

## 2021-12-17 PROCEDURE — A9576 INJ PROHANCE MULTIPACK: HCPCS | Performed by: NURSE PRACTITIONER

## 2021-12-17 PROCEDURE — 83550 IRON BINDING TEST: CPT

## 2021-12-17 PROCEDURE — 6360000002 HC RX W HCPCS: Performed by: INTERNAL MEDICINE

## 2021-12-17 PROCEDURE — 81001 URINALYSIS AUTO W/SCOPE: CPT

## 2021-12-17 PROCEDURE — 1200000000 HC SEMI PRIVATE

## 2021-12-17 PROCEDURE — 2580000003 HC RX 258: Performed by: NURSE PRACTITIONER

## 2021-12-17 PROCEDURE — 82728 ASSAY OF FERRITIN: CPT

## 2021-12-17 PROCEDURE — 6360000002 HC RX W HCPCS: Performed by: NURSE PRACTITIONER

## 2021-12-17 PROCEDURE — 6370000000 HC RX 637 (ALT 250 FOR IP): Performed by: INTERNAL MEDICINE

## 2021-12-17 PROCEDURE — 87086 URINE CULTURE/COLONY COUNT: CPT

## 2021-12-17 PROCEDURE — 6370000000 HC RX 637 (ALT 250 FOR IP): Performed by: NURSE PRACTITIONER

## 2021-12-17 PROCEDURE — 82607 VITAMIN B-12: CPT

## 2021-12-17 PROCEDURE — 99232 SBSQ HOSP IP/OBS MODERATE 35: CPT | Performed by: INTERNAL MEDICINE

## 2021-12-17 PROCEDURE — 87641 MR-STAPH DNA AMP PROBE: CPT

## 2021-12-17 PROCEDURE — 85027 COMPLETE CBC AUTOMATED: CPT

## 2021-12-17 PROCEDURE — 93010 ELECTROCARDIOGRAM REPORT: CPT | Performed by: INTERNAL MEDICINE

## 2021-12-17 RX ORDER — METHADONE HYDROCHLORIDE 5 MG/5ML
100 SOLUTION ORAL DAILY
Status: DISCONTINUED | OUTPATIENT
Start: 2021-12-17 | End: 2021-12-21 | Stop reason: HOSPADM

## 2021-12-17 RX ORDER — VENLAFAXINE HYDROCHLORIDE 75 MG/1
75 CAPSULE, EXTENDED RELEASE ORAL
Status: DISCONTINUED | OUTPATIENT
Start: 2021-12-18 | End: 2021-12-21 | Stop reason: HOSPADM

## 2021-12-17 RX ORDER — SODIUM CHLORIDE 0.9 % (FLUSH) 0.9 %
10 SYRINGE (ML) INJECTION PRN
Status: DISCONTINUED | OUTPATIENT
Start: 2021-12-17 | End: 2021-12-21 | Stop reason: HOSPADM

## 2021-12-17 RX ORDER — LORAZEPAM 2 MG/ML
1 INJECTION INTRAMUSCULAR ONCE
Status: COMPLETED | OUTPATIENT
Start: 2021-12-17 | End: 2021-12-17

## 2021-12-17 RX ADMIN — GABAPENTIN 800 MG: 800 TABLET ORAL at 13:13

## 2021-12-17 RX ADMIN — LORAZEPAM 1 MG: 2 INJECTION INTRAMUSCULAR; INTRAVENOUS at 14:13

## 2021-12-17 RX ADMIN — ENOXAPARIN SODIUM 80 MG: 100 INJECTION SUBCUTANEOUS at 21:38

## 2021-12-17 RX ADMIN — FAMOTIDINE 20 MG: 10 INJECTION INTRAVENOUS at 21:38

## 2021-12-17 RX ADMIN — Medication 1250 MG: at 01:08

## 2021-12-17 RX ADMIN — FAMOTIDINE 20 MG: 10 INJECTION INTRAVENOUS at 08:52

## 2021-12-17 RX ADMIN — METHADONE HYDROCHLORIDE 100 MG: 5 SOLUTION ORAL at 12:11

## 2021-12-17 RX ADMIN — ENOXAPARIN SODIUM 80 MG: 100 INJECTION SUBCUTANEOUS at 08:52

## 2021-12-17 RX ADMIN — GADOTERIDOL 16 ML: 279.3 INJECTION, SOLUTION INTRAVENOUS at 15:15

## 2021-12-17 RX ADMIN — BACLOFEN 20 MG: 10 TABLET ORAL at 08:53

## 2021-12-17 RX ADMIN — GABAPENTIN 800 MG: 800 TABLET ORAL at 08:53

## 2021-12-17 RX ADMIN — SODIUM CHLORIDE, PRESERVATIVE FREE 10 ML: 5 INJECTION INTRAVENOUS at 08:53

## 2021-12-17 RX ADMIN — ACETAMINOPHEN 650 MG: 325 TABLET ORAL at 07:55

## 2021-12-17 RX ADMIN — BACLOFEN 20 MG: 10 TABLET ORAL at 13:13

## 2021-12-17 RX ADMIN — Medication 1250 MG: at 16:00

## 2021-12-17 RX ADMIN — GABAPENTIN 800 MG: 800 TABLET ORAL at 21:38

## 2021-12-17 RX ADMIN — BACLOFEN 20 MG: 10 TABLET ORAL at 21:38

## 2021-12-17 RX ADMIN — Medication 1250 MG: at 08:53

## 2021-12-17 ASSESSMENT — PAIN DESCRIPTION - LOCATION: LOCATION: FOOT

## 2021-12-17 ASSESSMENT — PAIN DESCRIPTION - ORIENTATION: ORIENTATION: LEFT

## 2021-12-17 ASSESSMENT — PAIN SCALES - GENERAL
PAINLEVEL_OUTOF10: 3
PAINLEVEL_OUTOF10: 0
PAINLEVEL_OUTOF10: 3
PAINLEVEL_OUTOF10: 10

## 2021-12-17 ASSESSMENT — PAIN DESCRIPTION - DESCRIPTORS: DESCRIPTORS: THROBBING;ACHING

## 2021-12-17 ASSESSMENT — ENCOUNTER SYMPTOMS
SORE THROAT: 0
VOMITING: 0
SHORTNESS OF BREATH: 0
COUGH: 0
DIARRHEA: 0
NAUSEA: 0
COLOR CHANGE: 0

## 2021-12-17 ASSESSMENT — PAIN DESCRIPTION - ONSET: ONSET: ON-GOING

## 2021-12-17 ASSESSMENT — PAIN DESCRIPTION - FREQUENCY: FREQUENCY: INTERMITTENT

## 2021-12-17 ASSESSMENT — PAIN DESCRIPTION - PAIN TYPE: TYPE: ACUTE PAIN
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
## 2021-12-17 NOTE — CARE COORDINATION
Case Management Initial Discharge Plan  Lori Lebron,             Met with:patient and [facility] [place] to discuss discharge plans. Information verified: address, contacts, phone number, , insurance Yes  Insurance Provider: [facility]    Emergency Contact/Next of Kin name & number: Blanca Malave, pt's grandfather and deacon at [phone]  Who are involved in patient's support system? Grandfather    PCP: No primary care provider on file. Date of last visit: none      Discharge Planning    Living Arrangements:  Alone     Home has 1 stories  0 stairs to climb to get into front door,   Location of bedroom/bathroom in home main    Patient able to perform ADL's:Independent    Current Services (outpatient & in home) none  DME equipment: cane  DME provider: n/a    Is patient receiving oral anticoagulation therapy? No    If indicated:   Physician managing anticoagulation treatment: n/a  Where does patient obtain lab work for ATC treatment? n/a      Potential Assistance Needed:       Patient agreeable to home care: No  [place] of choice provided:  no    Prior SNF/Rehab Placement and Facility: [facility] 4 months ago  Agreeable to SNF/Rehab: n/a  [place] of choice provided: no     Evaluation: no    Expected Discharge date:       Patient expects to be discharged to: If home: is the family and/or caregiver wiling & able to provide support at home? non3  Who will be providing this support? none    Follow Up Appointment: Best Day/ Time:      Transportation provider: Police  Transportation arrangements needed for discharge: No    Readmission Risk              Risk of Unplanned Readmission:  17             Does patient have a readmission risk score greater than 14?: Yes  If yes, follow-up appointment must be made within 7 days of discharge.      Goals of Care:       Educated pt and [facility] [place] on transitional options, provided freedom of choice and are agreeable with plan      Discharge Plan: Go to [facility] senior care when discharged.            Electronically signed by Su Acuña RN on 12/17/21 at 6:03 PM EST
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
## 2021-12-17 NOTE — PROGRESS NOTES
Physical Therapy        Physical Therapy Cancel Note      DATE: 2021    NAME: Liya Ballesteros  MRN: [id number]   : 1985      Patient not seen this date for Physical Therapy due to:    Testing: awaiting MRI results with podiatry POC. PT will continue to follow for needs during admission.        Electronically signed by Yakelin Simms PT on 2021 at 10:47 AM

## 2021-12-17 NOTE — ED NOTES
Report called to room 234, pt tranpsorted, luz answered, pt under sherri custody          Ryan Rucker RN  12/16/21 [id number]

## 2021-12-17 NOTE — PROGRESS NOTES
confirmed dose of methadone with [facility] he take 100mg of methadone liquid everyday last dose dec 15 at 837am notified dr yuli eubanks serve

## 2021-12-17 NOTE — PROGRESS NOTES
Tried to call for methadone dose at [facility] talked with them need release of information faxed to them waiting for call back.

## 2021-12-17 NOTE — PROGRESS NOTES
Received call from Microbiology to send a different sample to run the MRSA testing as the specimen that was sent has the gel in the bottom and will not be able to perform the test.    Bharti Razo will send the correct swab to us for the MRSA nasal  Test.    Primary nurse, Chino Dumont, notified of above.

## 2021-12-17 NOTE — PROGRESS NOTES
[facility] ([facility])  Occupational Therapy Not Seen Note    DATE: 2021    NAME: Alyce Vasquez  MRN: [id number]   : 1985      Patient not seen this date for Occupational Therapy due to:     Other: Await podiatry consult for POC     Next Scheduled Treatment: Ck in pm or      Electronically signed by Medhat Hensley OT on 2021 at 8:04 AM 17-Sep-2017 13:52

## 2021-12-17 NOTE — PLAN OF CARE
Brought patient down to MRI for MRI of left foot. Patient was uncooperative, would not lay on his back, keep pulling his foot out of the coil and rolling to his side. Attempted two times, called RN and said there was no meds available to give the patient, and then sent patient back to room. Will attempt later if time permits.
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
## 2021-12-18 ENCOUNTER — APPOINTMENT (OUTPATIENT)
Dept: MRI IMAGING | Age: 36
DRG: 317 | End: 2021-12-18
Payer: COMMERCIAL

## 2021-12-18 ENCOUNTER — APPOINTMENT (OUTPATIENT)
Dept: ULTRASOUND IMAGING | Age: 36
DRG: 317 | End: 2021-12-18
Payer: COMMERCIAL

## 2021-12-18 LAB
ABSOLUTE EOS #: 0.34 K/UL (ref 0–0.44)
ABSOLUTE IMMATURE GRANULOCYTE: <0.03 K/UL (ref 0–0.3)
ABSOLUTE LYMPH #: 2.19 K/UL (ref 1.1–3.7)
ABSOLUTE MONO #: 0.55 K/UL (ref 0.1–1.2)
ALBUMIN SERPL-MCNC: 3.2 G/DL (ref 3.5–5.2)
ANION GAP SERPL CALCULATED.3IONS-SCNC: 10 MMOL/L (ref 9–17)
BASOPHILS # BLD: 1 % (ref 0–2)
BASOPHILS ABSOLUTE: 0.07 K/UL (ref 0–0.2)
BUN BLDV-MCNC: 9 MG/DL (ref 6–20)
BUN/CREAT BLD: ABNORMAL (ref 9–20)
CALCIUM SERPL-MCNC: 8.4 MG/DL (ref 8.6–10.4)
CHLORIDE BLD-SCNC: 102 MMOL/L (ref 98–107)
CO2: 25 MMOL/L (ref 20–31)
CREAT SERPL-MCNC: 0.5 MG/DL (ref 0.7–1.2)
CULTURE: NO GROWTH
DIFFERENTIAL TYPE: ABNORMAL
EOSINOPHILS RELATIVE PERCENT: 5 % (ref 1–4)
FOLATE: 7.4 NG/ML
GFR AFRICAN AMERICAN: >60 ML/MIN
GFR NON-AFRICAN AMERICAN: >60 ML/MIN
GFR SERPL CREATININE-BSD FRML MDRD: ABNORMAL ML/MIN/{1.73_M2}
GFR SERPL CREATININE-BSD FRML MDRD: ABNORMAL ML/MIN/{1.73_M2}
GLUCOSE BLD-MCNC: 91 MG/DL (ref 70–99)
HCT VFR BLD CALC: 38.9 % (ref 40.7–50.3)
HEMOGLOBIN: 11.1 G/DL (ref 13–17)
IMMATURE GRANULOCYTES: 0 %
IRON SATURATION: 10 % (ref 20–55)
IRON: 30 UG/DL (ref 59–158)
LYMPHOCYTES # BLD: 35 % (ref 24–43)
Lab: NORMAL
MAGNESIUM: 1.7 MG/DL (ref 1.6–2.6)
MCH RBC QN AUTO: 20.6 PG (ref 25.2–33.5)
MCHC RBC AUTO-ENTMCNC: 28.5 G/DL (ref 28.4–34.8)
MCV RBC AUTO: 72 FL (ref 82.6–102.9)
MONOCYTES # BLD: 9 % (ref 3–12)
MRSA, DNA, NASAL: ABNORMAL
NRBC AUTOMATED: 0 PER 100 WBC
PDW BLD-RTO: 16.8 % (ref 11.8–14.4)
PHOSPHORUS: 3.1 MG/DL (ref 2.5–4.5)
PLATELET # BLD: 212 K/UL (ref 138–453)
PLATELET ESTIMATE: ABNORMAL
PMV BLD AUTO: 9 FL (ref 8.1–13.5)
POTASSIUM SERPL-SCNC: 4.3 MMOL/L (ref 3.7–5.3)
RBC # BLD: 5.4 M/UL (ref 4.21–5.77)
RBC # BLD: ABNORMAL 10*6/UL
SEG NEUTROPHILS: 50 % (ref 36–65)
SEGMENTED NEUTROPHILS ABSOLUTE COUNT: 3.17 K/UL (ref 1.5–8.1)
SODIUM BLD-SCNC: 137 MMOL/L (ref 135–144)
SPECIMEN DESCRIPTION: ABNORMAL
SPECIMEN DESCRIPTION: NORMAL
TOTAL IRON BINDING CAPACITY: 296 UG/DL (ref 250–450)
UNSATURATED IRON BINDING CAPACITY: 266 UG/DL (ref 112–347)
VANCOMYCIN TROUGH DATE LAST DOSE: ABNORMAL
VANCOMYCIN TROUGH DOSE AMOUNT: ABNORMAL
VANCOMYCIN TROUGH TIME LAST DOSE: ABNORMAL
VANCOMYCIN TROUGH: 21.1 UG/ML (ref 10–20)
VITAMIN B-12: 456 PG/ML (ref 232–1245)
WBC # BLD: 6.3 K/UL (ref 3.5–11.3)
WBC # BLD: ABNORMAL 10*3/UL

## 2021-12-18 PROCEDURE — 6360000004 HC RX CONTRAST MEDICATION: Performed by: STUDENT IN AN ORGANIZED HEALTH CARE EDUCATION/TRAINING PROGRAM

## 2021-12-18 PROCEDURE — 1200000000 HC SEMI PRIVATE

## 2021-12-18 PROCEDURE — 82746 ASSAY OF FOLIC ACID SERUM: CPT

## 2021-12-18 PROCEDURE — 94761 N-INVAS EAR/PLS OXIMETRY MLT: CPT

## 2021-12-18 PROCEDURE — 80202 ASSAY OF VANCOMYCIN: CPT

## 2021-12-18 PROCEDURE — 6360000002 HC RX W HCPCS: Performed by: NURSE PRACTITIONER

## 2021-12-18 PROCEDURE — 85025 COMPLETE CBC W/AUTO DIFF WBC: CPT

## 2021-12-18 PROCEDURE — 6360000002 HC RX W HCPCS: Performed by: INTERNAL MEDICINE

## 2021-12-18 PROCEDURE — 6370000000 HC RX 637 (ALT 250 FOR IP): Performed by: INTERNAL MEDICINE

## 2021-12-18 PROCEDURE — 73720 MRI LWR EXTREMITY W/O&W/DYE: CPT

## 2021-12-18 PROCEDURE — 80069 RENAL FUNCTION PANEL: CPT

## 2021-12-18 PROCEDURE — 2580000003 HC RX 258: Performed by: NURSE PRACTITIONER

## 2021-12-18 PROCEDURE — A9576 INJ PROHANCE MULTIPACK: HCPCS | Performed by: STUDENT IN AN ORGANIZED HEALTH CARE EDUCATION/TRAINING PROGRAM

## 2021-12-18 PROCEDURE — 36415 COLL VENOUS BLD VENIPUNCTURE: CPT

## 2021-12-18 PROCEDURE — 99232 SBSQ HOSP IP/OBS MODERATE 35: CPT | Performed by: INTERNAL MEDICINE

## 2021-12-18 PROCEDURE — 93970 EXTREMITY STUDY: CPT

## 2021-12-18 PROCEDURE — 83540 ASSAY OF IRON: CPT

## 2021-12-18 PROCEDURE — 83550 IRON BINDING TEST: CPT

## 2021-12-18 PROCEDURE — 82607 VITAMIN B-12: CPT

## 2021-12-18 PROCEDURE — 2500000003 HC RX 250 WO HCPCS: Performed by: NURSE PRACTITIONER

## 2021-12-18 PROCEDURE — 83735 ASSAY OF MAGNESIUM: CPT

## 2021-12-18 PROCEDURE — 6370000000 HC RX 637 (ALT 250 FOR IP): Performed by: NURSE PRACTITIONER

## 2021-12-18 PROCEDURE — 76882 US LMTD JT/FCL EVL NVASC XTR: CPT

## 2021-12-18 RX ORDER — KETOROLAC TROMETHAMINE 30 MG/ML
30 INJECTION, SOLUTION INTRAMUSCULAR; INTRAVENOUS ONCE
Status: COMPLETED | OUTPATIENT
Start: 2021-12-18 | End: 2021-12-18

## 2021-12-18 RX ORDER — KETOROLAC TROMETHAMINE 30 MG/ML
30 INJECTION, SOLUTION INTRAMUSCULAR; INTRAVENOUS ONCE
Status: DISCONTINUED | OUTPATIENT
Start: 2021-12-18 | End: 2021-12-21 | Stop reason: HOSPADM

## 2021-12-18 RX ORDER — SODIUM CHLORIDE 0.9 % (FLUSH) 0.9 %
10 SYRINGE (ML) INJECTION PRN
Status: DISCONTINUED | OUTPATIENT
Start: 2021-12-18 | End: 2021-12-21 | Stop reason: HOSPADM

## 2021-12-18 RX ORDER — VANCOMYCIN HYDROCHLORIDE 1 G/200ML
1000 INJECTION, SOLUTION INTRAVENOUS EVERY 8 HOURS
Status: DISCONTINUED | OUTPATIENT
Start: 2021-12-18 | End: 2021-12-21

## 2021-12-18 RX ORDER — LORAZEPAM 2 MG/ML
1 INJECTION INTRAMUSCULAR
Status: COMPLETED | OUTPATIENT
Start: 2021-12-18 | End: 2021-12-18

## 2021-12-18 RX ORDER — FAMOTIDINE 20 MG/1
20 TABLET, FILM COATED ORAL 2 TIMES DAILY
Status: DISCONTINUED | OUTPATIENT
Start: 2021-12-18 | End: 2021-12-21 | Stop reason: HOSPADM

## 2021-12-18 RX ADMIN — GABAPENTIN 800 MG: 800 TABLET ORAL at 12:59

## 2021-12-18 RX ADMIN — FAMOTIDINE 20 MG: 20 TABLET, FILM COATED ORAL at 21:46

## 2021-12-18 RX ADMIN — METHADONE HYDROCHLORIDE 100 MG: 5 SOLUTION ORAL at 08:40

## 2021-12-18 RX ADMIN — Medication 1250 MG: at 00:19

## 2021-12-18 RX ADMIN — ENOXAPARIN SODIUM 80 MG: 100 INJECTION SUBCUTANEOUS at 08:40

## 2021-12-18 RX ADMIN — ENOXAPARIN SODIUM 80 MG: 100 INJECTION SUBCUTANEOUS at 21:44

## 2021-12-18 RX ADMIN — BACLOFEN 20 MG: 10 TABLET ORAL at 12:59

## 2021-12-18 RX ADMIN — LORAZEPAM 1 MG: 2 INJECTION INTRAMUSCULAR; INTRAVENOUS at 16:57

## 2021-12-18 RX ADMIN — KETOROLAC TROMETHAMINE 30 MG: 30 INJECTION, SOLUTION INTRAMUSCULAR; INTRAVENOUS at 19:58

## 2021-12-18 RX ADMIN — GADOTERIDOL 15 ML: 279.3 INJECTION, SOLUTION INTRAVENOUS at 18:53

## 2021-12-18 RX ADMIN — GABAPENTIN 800 MG: 800 TABLET ORAL at 21:45

## 2021-12-18 RX ADMIN — GABAPENTIN 800 MG: 800 TABLET ORAL at 08:40

## 2021-12-18 RX ADMIN — BACLOFEN 20 MG: 10 TABLET ORAL at 08:40

## 2021-12-18 RX ADMIN — VANCOMYCIN HYDROCHLORIDE 1000 MG: 1 INJECTION, SOLUTION INTRAVENOUS at 23:57

## 2021-12-18 RX ADMIN — Medication 1250 MG: at 08:40

## 2021-12-18 RX ADMIN — VANCOMYCIN HYDROCHLORIDE 1000 MG: 1 INJECTION, SOLUTION INTRAVENOUS at 18:48

## 2021-12-18 RX ADMIN — FAMOTIDINE 20 MG: 10 INJECTION INTRAVENOUS at 08:40

## 2021-12-18 RX ADMIN — BACLOFEN 20 MG: 10 TABLET ORAL at 21:44

## 2021-12-18 RX ADMIN — SODIUM CHLORIDE, PRESERVATIVE FREE 10 ML: 5 INJECTION INTRAVENOUS at 21:47

## 2021-12-18 ASSESSMENT — PAIN DESCRIPTION - DESCRIPTORS
DESCRIPTORS: ACHING;THROBBING
DESCRIPTORS: ACHING;CONSTANT

## 2021-12-18 ASSESSMENT — PAIN SCALES - GENERAL
PAINLEVEL_OUTOF10: 10

## 2021-12-18 ASSESSMENT — PAIN DESCRIPTION - FREQUENCY
FREQUENCY: CONTINUOUS
FREQUENCY: INTERMITTENT

## 2021-12-18 ASSESSMENT — PAIN DESCRIPTION - ONSET
ONSET: ON-GOING
ONSET: ON-GOING

## 2021-12-18 ASSESSMENT — PAIN DESCRIPTION - ORIENTATION
ORIENTATION: LEFT
ORIENTATION: RIGHT;LOWER

## 2021-12-18 ASSESSMENT — PAIN DESCRIPTION - PAIN TYPE
TYPE: ACUTE PAIN
TYPE: ACUTE PAIN

## 2021-12-18 ASSESSMENT — PAIN DESCRIPTION - LOCATION
LOCATION: BACK
LOCATION: FOOT
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
## 2021-12-18 NOTE — PROGRESS NOTES
Lab called critical of vanco through [facility] managing the vanco . Notified pharmacist and will adjust vanco if needed

## 2021-12-18 NOTE — PROGRESS NOTES
[facility] [facility] Pharmacokinetic Monitoring Service - Vancomycin    Consulting Provider: Dr. Mohsen Duncan  Indication: Osteomyelitis  Target Concentration: Goal AUC/ROMARIO 400-600 mg*hr/L  Day of Therapy: 3  Additional Antimicrobials: none    Pertinent Laboratory Values: Wt Readings from Last 1 Encounters:   12/18/21 175 lb 11.2 oz (79.7 kg)     Temp Readings from Last 1 Encounters:   12/18/21 98 °F (36.7 °C) (Axillary)     Estimated Creatinine Clearance: 198 mL/min (A) (based on SCr of 0.5 mg/dL (L)).   Recent Labs     12/17/21  0641 12/18/21  0600   CREATININE 0.58* 0.50*   WBC 5.5 6.3       Pertinent Cultures:  Culture Date Source Results   12/16 blood NGTD   MRSA Nasal Swab: showed MRSA positive result on 12/17    Recent vancomycin administrations                     vancomycin (VANCOCIN) 1250 mg in dextrose 5 % 250 mL IVPB (mg) 1,250 mg New Bag 12/18/21 0840     1,250 mg New Bag  0019     1,250 mg New Bag 12/17/21 1600     1,250 mg New Bag  0853     1,250 mg New Bag  0108     1,250 mg New Bag 12/16/21 1751                    Assessment:  Date/Time Current Dose Concentration Timing of Concentration (h) AUC   12/18/21 1250mg q8h Ordered 12/18 @1530 --- ---   Note: Serum concentrations collected for AUC dosing may appear elevated if collected in close proximity to the dose administered, this is not necessarily an indication of toxicity    Plan:  Based on current dosing regimen, predicted  and trough 22.7  Will decrease dose to 1000mg q8h for a predicted  and trough 18.2  Ordered a trough today @ 1530   Pharmacy will continue to monitor patient and adjust therapy as indicated    Thank you for the consult,  [facility]  12/18/2021 12:54 PM

## 2021-12-18 NOTE — PROGRESS NOTES
Physical Therapy        Physical Therapy Cancel Note      DATE: 2021    NAME: Jaz Sandoval  MRN: [id number]   : 1985      Patient not seen this date for Physical Therapy due to:    Testing:   LE dopplers      Electronically signed by Enrique Yepez PT on 2021 at 2:08 PM

## 2021-12-18 NOTE — PROGRESS NOTES
Progress Note  Podiatric Medicine and Surgery     Subjective     CC: Left lower extremity pain    Patient seen and examined at bedside with police officers in room  No acute events overnight. Afebrile, vital signs stable   Relates continued pain to the left lower extremity    HPI :  Сергей Gallagher is a 39 y.o. male seen at [facility] for pain to the left foot and left leg. The patient states that he has a history of cardiac surgery this year. Patient states that recently when he awoke one morning his left calf was swollen, painful, and felt like a mark horse--he was unable to ambulate well. The patient went to multiple EDs for evaluation but left all AMA--he reports \"an abscess of the left calf and septic emboli\" were found on imaging, however, the patient did not receive treatment. He does admit to using IV cocaine and the last instance was 2 weeks ago. He denies any open wounds.  are at bedside, patient is incarcerated. ROS: Denies N/V/F/C/SOB/CP. Otherwise negative except at stated in the HPI.      Medications:  Scheduled Meds:   vancomycin  1,000 mg IntraVENous Q8H    enoxaparin  1 mg/kg SubCUTAneous BID    methadone  100 mg Oral Daily    venlafaxine  75 mg Oral Daily with breakfast    vancomycin (VANCOCIN) intermittent dosing (placeholder)   Other RX Placeholder    [Held by provider] apixaban  5 mg Oral BID    sodium chloride flush  5-40 mL IntraVENous 2 times per day    famotidine (PEPCID) injection  20 mg IntraVENous BID    gabapentin  800 mg Oral TID    baclofen  20 mg Oral TID       Continuous Infusions:   sodium chloride         PRN Meds:LORazepam, sodium chloride flush, sodium chloride flush, sodium chloride, potassium chloride **OR** potassium alternative oral replacement **OR** potassium chloride, magnesium sulfate, ondansetron **OR** ondansetron, polyethylene glycol, acetaminophen **OR** acetaminophen    Objective     Vitals:  Patient Vitals for the past 8 hrs:   BP Temp Temp src Pulse Resp SpO2 Weight   21 0840     19     21 0645 102/66 98 °F (36.7 °C) Axillary 87 16 100 % 175 lb 11.2 oz (79.7 kg)   21 0600       175 lb 11.2 oz (79.7 kg)     Average, Min, and Max for last 24 hours Vitals:  TEMPERATURE:  Temp  Av.8 °F (36.6 °C)  Min: 97.6 °F (36.4 °C)  Max: 98 °F (36.7 °C)    RESPIRATIONS RANGE: Resp  Av.7  Min: 15  Max: 19    PULSE RANGE: Pulse  Av.5  Min: 87  Max: 108    BLOOD PRESSURE RANGE:  Systolic (42YGY), UHH:298 , Min:102 , LOF:844   ; Diastolic (63RPO), GEL:74, Min:61, Max:66      PULSE OXIMETRY RANGE: SpO2  Av %  Min: 98 %  Max: 100 %    I/O last 3 completed shifts:  In: -   Out: 1900 [Urine:1900]    CBC:  Recent Labs     21  1519 21  0641 21  0600   WBC 5.4 5.5 6.3   HGB 10.5* 10.8* 11.1*   HCT 37.0* 36.7* 38.9*    199 212   CRP 16.0*  --   --         BMP:  Recent Labs     21  1519 21  0641 21  0600    138 137   K 4.2 4.3 4.3    103 102   CO2 27 26 25   BUN 8 10 9   CREATININE 0.49* 0.58* 0.50*   GLUCOSE 100* 101* 91   CALCIUM 9.1 8.7 8.4*        Coags:  Recent Labs     21  1519   APTT 24.1   PROT 7.6   INR 1.1       Lab Results   Component Value Date    SEDRATE 31 (H) 2021     Recent Labs     21  1519   CRP 16.0*       Lower Extremity Physical Exam:    Vascular: DP and PT pulses are Palpable . CFT <3 seconds to all digits. Hair growth is present to the level of the digits. Moderate non-pitting edema to the left lower extremity.       Neuro: Saph/sural/SP/DP/plantar sensation intact to light touch.     Musculoskeletal: Muscle strength is 5/5 to all lower extremity muscle groups on the right, deferred on the left because of increased pain to calf and foot. Gross deformity is absent. TTP of calf and 1st MPJ, left. Pain with left 1st MPJ ROM. Pain with plantarflexion and dorsiflexion of the left ankle. Compartments of LLE taut but compressible.    Dermatologic: No open wounds, drainage, malodor, or erythema. No crepitus, fluctuance, or associated in warmth. Interdigital maceration absent. Imaging:   US EXTREMITY LEFT NON VASC LIMITED   Preliminary Result   No convincing sonographic findings of abscess. Calf muscle is mildly heterogeneous and hypoechoic suggesting possible   inflammation/edema, injury, or myositis. MRI is recommended for further   evaluation. MRI FOOT LEFT W WO CONTRAST   Final Result   1. Soft tissue edema and enhancement about the great toe most suggestive of   cellulitis. Underlying moderate 1st MTP joint effusion and osteomyelitis of   the proximal phalanx and 1st metatarsal with findings concerning for septic   joint. Intraosseous abscess present the proximal phalanx and 1st metatarsal   head. Edema and enhancement extend to the 1st metatarsal base. RECOMMENDATIONS:   Unavailable         XR CHEST PORTABLE   Final Result   Patchy bilateral nodular opacities in the lungs more in the left base. Consider follow-up CT         XR TIBIA FIBULA LEFT (2 VIEWS)   Final Result   No acute osseous abnormality. XR ANKLE LEFT (MIN 3 VIEWS)   Final Result   Left ankle: No acute bony abnormality. Left foot: Demineralization on both sides of the 1st metatarsal-phalangeal   and D IP joints the great toe with soft tissue swelling. No cortical   destruction but findings may be compatible with osteomyelitis less likely   erosive osteoarthritis. RECOMMENDATION:   Radionuclide bone imaging versus MRI study of the foot advised. XR FOOT LEFT (MIN 3 VIEWS)   Final Result   Left ankle: No acute bony abnormality. Left foot: Demineralization on both sides of the 1st metatarsal-phalangeal   and D IP joints the great toe with soft tissue swelling. No cortical   destruction but findings may be compatible with osteomyelitis less likely   erosive osteoarthritis.       RECOMMENDATION:   Radionuclide bone imaging versus MRI study of the foot advised. VL DUP LOWER EXTREMITY VENOUS BILATERAL    (Results Pending)   MRI TIBIA FIBULA LEFT W WO CONTRAST    (Results Pending)       Assessment   Zhane Medellin is a 39 y.o. male with   1. Septic joint 1st MPJ, left   2. Acute left calf pain   3. IV drug use    Principal Problem:    Osteomyelitis ([address]  Active Problems:    Chronic right-sided low back pain with right-sided sciatica    Drug abuse ([address]    Chronic hepatitis C without hepatic coma (HCC)    Cocaine abuse ([address]    Methadone dependence ([address]    Pulmonary embolism (HCC)    Microcytic anemia  Resolved Problems:    * No resolved hospital problems. *       Plan     · Patient seen and evaluated at bedside. · Treatment options discussed in detail with the patient. · Radiographs reviewed  ? Negative for soft tissue emphysema, acute fracture/dislocation, foreign body, or osteomyelitis. There is demineralization of the proximal phalanx and 1st met head. · MRI of the left foot reviewed: suspicious for 1st MPJ septic joint, osteomyelitis of 1st met head and proximal phalanx and intraosseous abscess. · MS[facility] ultrasound left lower extremity reviewed: No convincing evidence of abscess, recommend MRI for further evaluation  · MRI of left tibia/fibula ordered-results pending  · Venous duplex of b/l LE ordered-results pending  · Internal medicine following as primary team  · Infectious disease following-recommend continuing vancomycin  · No dressing required. · WBAT to Bilateral lower extremity. · No surgical intervention planned at this time. Will follow up results of venous duplex and MRI of left tibia/fibula for further planning. · Discussed with Dr. Reid Gutierrez.       Cassandra Colin DPM   Podiatric Medicine & Surgery   12/18/2021 at 1:52 PM

## 2021-12-18 NOTE — PROGRESS NOTES
Occupational [address] [employer]  Occupational Therapy Not Seen Note    DATE: 2021    NAME: Alyce Vasquez  MRN: [id number]   : 1985      Patient not seen this date for Occupational Therapy due to: Other: Dopplers ordered to r/o DVT BLE's.  Await results    Next Scheduled Treatment:     Electronically signed by JULIA Lynn on 2021 at 4:02 PM

## 2021-12-18 NOTE — PROGRESS NOTES
[address] [facility] Pharmacokinetic Monitoring Service - Vancomycin    Consulting Provider: Dr. Nagy Overall   Indication: Osteomyelitis  Target Concentration: Goal AUC/ROMARIO 400-600 mg*hr/L  Day of Therapy: 3  Additional Antimicrobials: none    Pertinent Laboratory Values: Wt Readings from Last 1 Encounters:   12/18/21 175 lb 11.2 oz (79.7 kg)     Temp Readings from Last 1 Encounters:   12/18/21 98 °F (36.7 °C) (Axillary)     Estimated Creatinine Clearance: 198 mL/min (A) (based on SCr of 0.5 mg/dL (L)).   Recent Labs     12/17/21  0641 12/18/21  0600   CREATININE 0.58* 0.50*   WBC 5.5 6.3       Pertinent Cultures:  Culture Date Source Results   12/16 blood NGTD   MRSA Nasal Swab: showed MRSA positive result on 12/17    Recent vancomycin administrations                     vancomycin (VANCOCIN) 1250 mg in dextrose 5 % 250 mL IVPB (mg) 1,250 mg New Bag 12/18/21 0840     1,250 mg New Bag  0019     1,250 mg New Bag 12/17/21 1600     1,250 mg New Bag  0853     1,250 mg New Bag  0108     1,250 mg New Bag 12/16/21 1751                    Assessment:  Date/Time Current Dose Concentration Timing of Concentration (h) AUC   12/18 15:02 1250mg q8h 21.1 6h post dose 647   Note: Serum concentrations collected for AUC dosing may appear elevated if collected in close proximity to the dose administered, this is not necessarily an indication of toxicity    Plan:  As anticipated 1250mg q8h regimen is supra-therapeutic  \"Decrease dose to 1000mg q8h for a predicted  and trough 16.5  Pharmacy will continue to monitor patient and adjust therapy as indicated    Thank you for the consult,  Cathleen Guevara, [facility]  12/18/2021 4:39 PM

## 2021-12-19 ENCOUNTER — APPOINTMENT (OUTPATIENT)
Dept: GENERAL RADIOLOGY | Age: 36
DRG: 317 | End: 2021-12-19
Payer: COMMERCIAL

## 2021-12-19 ENCOUNTER — ANESTHESIA EVENT (OUTPATIENT)
Dept: OPERATING ROOM | Age: 36
DRG: 317 | End: 2021-12-19
Payer: COMMERCIAL

## 2021-12-19 LAB
ABSOLUTE EOS #: 0.26 K/UL (ref 0–0.4)
ABSOLUTE IMMATURE GRANULOCYTE: 0.05 K/UL (ref 0–0.3)
ABSOLUTE LYMPH #: 2.18 K/UL (ref 1–4.8)
ABSOLUTE MONO #: 0.21 K/UL (ref 0.1–0.8)
ALBUMIN SERPL-MCNC: 3 G/DL (ref 3.5–5.2)
ANION GAP SERPL CALCULATED.3IONS-SCNC: 9 MMOL/L (ref 9–17)
BASOPHILS # BLD: 0 % (ref 0–2)
BASOPHILS ABSOLUTE: 0 K/UL (ref 0–0.2)
BUN BLDV-MCNC: 17 MG/DL (ref 6–20)
BUN/CREAT BLD: ABNORMAL (ref 9–20)
CALCIUM SERPL-MCNC: 8.3 MG/DL (ref 8.6–10.4)
CHLORIDE BLD-SCNC: 100 MMOL/L (ref 98–107)
CO2: 26 MMOL/L (ref 20–31)
CREAT SERPL-MCNC: 0.55 MG/DL (ref 0.7–1.2)
DIFFERENTIAL TYPE: ABNORMAL
EOSINOPHILS RELATIVE PERCENT: 5 % (ref 1–4)
FOLATE: 6.7 NG/ML
GFR AFRICAN AMERICAN: >60 ML/MIN
GFR NON-AFRICAN AMERICAN: >60 ML/MIN
GFR SERPL CREATININE-BSD FRML MDRD: ABNORMAL ML/MIN/{1.73_M2}
GFR SERPL CREATININE-BSD FRML MDRD: ABNORMAL ML/MIN/{1.73_M2}
GLUCOSE BLD-MCNC: 93 MG/DL (ref 70–99)
HCT VFR BLD CALC: 37.3 % (ref 40.7–50.3)
HEMOGLOBIN: 10.3 G/DL (ref 13–17)
IMMATURE GRANULOCYTES: 1 %
IRON SATURATION: 9 % (ref 20–55)
IRON: 28 UG/DL (ref 59–158)
LYMPHOCYTES # BLD: 42 % (ref 24–44)
MAGNESIUM: 1.8 MG/DL (ref 1.6–2.6)
MCH RBC QN AUTO: 20 PG (ref 25.2–33.5)
MCHC RBC AUTO-ENTMCNC: 27.6 G/DL (ref 28.4–34.8)
MCV RBC AUTO: 72.4 FL (ref 82.6–102.9)
MONOCYTES # BLD: 4 % (ref 1–7)
MORPHOLOGY: ABNORMAL
MORPHOLOGY: ABNORMAL
NRBC AUTOMATED: 0 PER 100 WBC
PDW BLD-RTO: 16.5 % (ref 11.8–14.4)
PHOSPHORUS: 4.7 MG/DL (ref 2.5–4.5)
PLATELET # BLD: 183 K/UL (ref 138–453)
PLATELET ESTIMATE: ABNORMAL
PMV BLD AUTO: 9.1 FL (ref 8.1–13.5)
POTASSIUM SERPL-SCNC: 4.3 MMOL/L (ref 3.7–5.3)
RBC # BLD: 5.15 M/UL (ref 4.21–5.77)
RBC # BLD: ABNORMAL 10*6/UL
SEG NEUTROPHILS: 48 % (ref 36–66)
SEGMENTED NEUTROPHILS ABSOLUTE COUNT: 2.5 K/UL (ref 1.8–7.7)
SODIUM BLD-SCNC: 135 MMOL/L (ref 135–144)
TOTAL IRON BINDING CAPACITY: 300 UG/DL (ref 250–450)
UNSATURATED IRON BINDING CAPACITY: 272 UG/DL (ref 112–347)
VITAMIN B-12: 404 PG/ML (ref 232–1245)
WBC # BLD: 5.2 K/UL (ref 3.5–11.3)
WBC # BLD: ABNORMAL 10*3/UL

## 2021-12-19 PROCEDURE — 82746 ASSAY OF FOLIC ACID SERUM: CPT

## 2021-12-19 PROCEDURE — 83735 ASSAY OF MAGNESIUM: CPT

## 2021-12-19 PROCEDURE — 36415 COLL VENOUS BLD VENIPUNCTURE: CPT

## 2021-12-19 PROCEDURE — 83540 ASSAY OF IRON: CPT

## 2021-12-19 PROCEDURE — 6370000000 HC RX 637 (ALT 250 FOR IP): Performed by: NURSE PRACTITIONER

## 2021-12-19 PROCEDURE — 6360000002 HC RX W HCPCS: Performed by: NURSE PRACTITIONER

## 2021-12-19 PROCEDURE — 1200000000 HC SEMI PRIVATE

## 2021-12-19 PROCEDURE — 6360000002 HC RX W HCPCS: Performed by: INTERNAL MEDICINE

## 2021-12-19 PROCEDURE — 99232 SBSQ HOSP IP/OBS MODERATE 35: CPT | Performed by: INTERNAL MEDICINE

## 2021-12-19 PROCEDURE — 6370000000 HC RX 637 (ALT 250 FOR IP): Performed by: INTERNAL MEDICINE

## 2021-12-19 PROCEDURE — 71045 X-RAY EXAM CHEST 1 VIEW: CPT

## 2021-12-19 PROCEDURE — 80069 RENAL FUNCTION PANEL: CPT

## 2021-12-19 PROCEDURE — 2580000003 HC RX 258: Performed by: NURSE PRACTITIONER

## 2021-12-19 PROCEDURE — 83550 IRON BINDING TEST: CPT

## 2021-12-19 PROCEDURE — 82607 VITAMIN B-12: CPT

## 2021-12-19 PROCEDURE — 85025 COMPLETE CBC W/AUTO DIFF WBC: CPT

## 2021-12-19 RX ORDER — LIDOCAINE HYDROCHLORIDE 10 MG/ML
20 INJECTION, SOLUTION EPIDURAL; INFILTRATION; INTRACAUDAL; PERINEURAL ONCE
Status: DISCONTINUED | OUTPATIENT
Start: 2021-12-19 | End: 2021-12-21 | Stop reason: HOSPADM

## 2021-12-19 RX ORDER — LORAZEPAM 2 MG/ML
0.5 INJECTION INTRAMUSCULAR
Status: COMPLETED | OUTPATIENT
Start: 2021-12-19 | End: 2021-12-19

## 2021-12-19 RX ADMIN — GABAPENTIN 800 MG: 800 TABLET ORAL at 21:26

## 2021-12-19 RX ADMIN — ENOXAPARIN SODIUM 80 MG: 100 INJECTION SUBCUTANEOUS at 08:29

## 2021-12-19 RX ADMIN — ENOXAPARIN SODIUM 80 MG: 100 INJECTION SUBCUTANEOUS at 21:26

## 2021-12-19 RX ADMIN — SODIUM CHLORIDE, PRESERVATIVE FREE 10 ML: 5 INJECTION INTRAVENOUS at 21:26

## 2021-12-19 RX ADMIN — BACLOFEN 20 MG: 10 TABLET ORAL at 13:25

## 2021-12-19 RX ADMIN — FAMOTIDINE 20 MG: 20 TABLET, FILM COATED ORAL at 08:29

## 2021-12-19 RX ADMIN — SODIUM CHLORIDE 25 ML: 9 INJECTION, SOLUTION INTRAVENOUS at 04:41

## 2021-12-19 RX ADMIN — GABAPENTIN 800 MG: 800 TABLET ORAL at 08:29

## 2021-12-19 RX ADMIN — LORAZEPAM 0.5 MG: 2 INJECTION INTRAMUSCULAR; INTRAVENOUS at 18:25

## 2021-12-19 RX ADMIN — BACLOFEN 20 MG: 10 TABLET ORAL at 08:29

## 2021-12-19 RX ADMIN — GABAPENTIN 800 MG: 800 TABLET ORAL at 13:25

## 2021-12-19 RX ADMIN — VANCOMYCIN HYDROCHLORIDE 1000 MG: 1 INJECTION, SOLUTION INTRAVENOUS at 07:41

## 2021-12-19 RX ADMIN — BACLOFEN 20 MG: 10 TABLET ORAL at 21:26

## 2021-12-19 RX ADMIN — VANCOMYCIN HYDROCHLORIDE 1000 MG: 1 INJECTION, SOLUTION INTRAVENOUS at 15:24

## 2021-12-19 RX ADMIN — FAMOTIDINE 20 MG: 20 TABLET, FILM COATED ORAL at 21:26

## 2021-12-19 RX ADMIN — METHADONE HYDROCHLORIDE 100 MG: 5 SOLUTION ORAL at 08:27

## 2021-12-19 ASSESSMENT — PAIN DESCRIPTION - FREQUENCY: FREQUENCY: CONTINUOUS

## 2021-12-19 ASSESSMENT — PAIN DESCRIPTION - PAIN TYPE: TYPE: ACUTE PAIN

## 2021-12-19 ASSESSMENT — PAIN DESCRIPTION - LOCATION: LOCATION: LEG

## 2021-12-19 ASSESSMENT — PAIN DESCRIPTION - DESCRIPTORS: DESCRIPTORS: ACHING;CONSTANT

## 2021-12-19 ASSESSMENT — PAIN SCALES - GENERAL
PAINLEVEL_OUTOF10: 10
PAINLEVEL_OUTOF10: 10

## 2021-12-19 ASSESSMENT — PAIN DESCRIPTION - ONSET: ONSET: ON-GOING

## 2021-12-19 ASSESSMENT — PAIN DESCRIPTION - ORIENTATION: ORIENTATION: LEFT

## 2021-12-19 NOTE — PROGRESS NOTES
pt states he don't feel right.  vs 97.7  98% room air 106/62 84 18  states fees at time cant take a deep breath at time sitting in bed talking to the davie eating dinner notified dr Nader Robles continue to monitor

## 2021-12-19 NOTE — PROGRESS NOTES
pt having back spasms after MRI wants to know ifdr order one time dose of motrin or Toradol  Perfect served the
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
## 2021-12-19 NOTE — PROGRESS NOTES
Progress Note  Podiatric Medicine and Surgery     Subjective     CC: Left lower extremity pain    Patient seen and examined at bedside with police officers in room  No acute events overnight. Afebrile, vital signs stable   Relates continued pain to the left lower extremity    HPI :  Zaida Childs is a 39 y.o. male seen at [address] [place] [address]. [facility] for pain to the left foot and left leg. The patient states that he has a history of cardiac surgery this year. Patient states that recently when he awoke one morning his left calf was swollen, painful, and felt like a mark horse--he was unable to ambulate well. The patient went to multiple EDs for evaluation but left all AMA--he reports \"an abscess of the left calf and septic emboli\" were found on imaging, however, the patient did not receive treatment. He does admit to using IV cocaine and the last instance was 2 weeks ago. He denies any open wounds.  are at bedside, patient is incarcerated. ROS: Denies N/V/F/C/SOB/CP. Otherwise negative except at stated in the HPI.      Medications:  Scheduled Meds:   lidocaine PF  20 mL IntraDERmal Once    vancomycin  1,000 mg IntraVENous Q8H    famotidine  20 mg Oral BID    ketorolac  30 mg IntraVENous Once    enoxaparin  1 mg/kg SubCUTAneous BID    methadone  100 mg Oral Daily    venlafaxine  75 mg Oral Daily with breakfast    vancomycin (VANCOCIN) intermittent dosing (placeholder)   Other RX Placeholder    [Held by provider] apixaban  5 mg Oral BID    sodium chloride flush  5-40 mL IntraVENous 2 times per day    gabapentin  800 mg Oral TID    baclofen  20 mg Oral TID       Continuous Infusions:   sodium chloride 25 mL (12/19/21 0441)       PRN Meds:sodium chloride flush, sodium chloride flush, sodium chloride flush, sodium chloride, potassium chloride **OR** potassium alternative oral replacement **OR** potassium chloride, magnesium sulfate, ondansetron **OR** ondansetron, polyethylene glycol, acetaminophen **OR** acetaminophen    Objective     Vitals:  Patient Vitals for the past 8 hrs:   SpO2   21 0827 98 %     Average, Min, and Max for last 24 hours Vitals:  TEMPERATURE:  Temp  Av.7 °F (36.5 °C)  Min: 97.2 °F (36.2 °C)  Max: 98.1 °F (36.7 °C)    RESPIRATIONS RANGE: Resp  Av  Min: 12  Max: 16    PULSE RANGE: Pulse  Av.9  Min: 82  Max: 99    BLOOD PRESSURE RANGE:  Systolic (03QUT), HEJ:541 , Min:121 , LLN:315   ; Diastolic (50GBT), BQ, Min:86, Max:86      PULSE OXIMETRY RANGE: SpO2  Av %  Min: 94 %  Max: 98 %    I/O last 3 completed shifts: In: 1260 [P.O.:720; I.V.:10; IV Piggyback:530]  Out: 650 [Urine:650]    CBC:  Recent Labs     21  0641 21  0600 21  0526   WBC 5.5 6.3 5.2   HGB 10.8* 11.1* 10.3*   HCT 36.7* 38.9* 37.3*    212 183        BMP:  Recent Labs     21  0641 21  0600 21  0526    137 135   K 4.3 4.3 4.3    102 100   CO2 26 25 26   BUN 10 9 17   CREATININE 0.58* 0.50* 0.55*   GLUCOSE 101* 91 93   CALCIUM 8.7 8.4* 8.3*        Coags:  No results for input(s): APTT, PROT, INR in the last 72 hours. Lab Results   Component Value Date    SEDRATE 31 (H) 2021     No results for input(s): CRP in the last 72 hours. Lower Extremity Physical Exam:    Vascular: DP and PT pulses are Palpable . CFT <3 seconds to all digits. Hair growth is present to the level of the digits. Moderate non-pitting edema to the left lower extremity.       Neuro: Saph/sural/SP/DP/plantar sensation intact to light touch.     Musculoskeletal: Muscle strength is 5/5 to all lower extremity muscle groups on the right, deferred on the left because of increased pain to calf and foot. Gross deformity is absent. TTP of calf and 1st MPJ, left. Minimal pain with left 1st MPJ ROM. Compartments of LLE taut but compressible.      Dermatologic: No open wounds, drainage, malodor, or erythema. No crepitus, fluctuance, or associated in warmth.  Interdigital maceration absent. Imaging:   MRI TIBIA FIBULA LEFT W WO CONTRAST   Final Result   1. Large organized collection along the superficial aspect of the medial head   of the gastrocnemius. The collection measures 1.8 x 5.8 x 18.7 cm and does   demonstrate peripheral enhancement. While sterility of fluid is   indeterminate on imaging, abscess cannot be excluded. 2. No osteomyelitis identified. US EXTREMITY LEFT NON VASC LIMITED   Final Result   No convincing sonographic findings typical of abscess. Calf muscle is mildly heterogeneous and hypoechoic suggesting possible   inflammation/edema, injury, or myositis versus complex collection adjacent to   the muscle. MRI is recommended for further evaluation. MRI FOOT LEFT W WO CONTRAST   Final Result   1. Soft tissue edema and enhancement about the great toe most suggestive of   cellulitis. Underlying moderate 1st MTP joint effusion and osteomyelitis of   the proximal phalanx and 1st metatarsal with findings concerning for septic   joint. Intraosseous abscess present the proximal phalanx and 1st metatarsal   head. Edema and enhancement extend to the 1st metatarsal base. RECOMMENDATIONS:   Unavailable         XR CHEST PORTABLE   Final Result   Patchy bilateral nodular opacities in the lungs more in the left base. Consider follow-up CT         XR TIBIA FIBULA LEFT (2 VIEWS)   Final Result   No acute osseous abnormality. XR ANKLE LEFT (MIN 3 VIEWS)   Final Result   Left ankle: No acute bony abnormality. Left foot: Demineralization on both sides of the 1st metatarsal-phalangeal   and D IP joints the great toe with soft tissue swelling. No cortical   destruction but findings may be compatible with osteomyelitis less likely   erosive osteoarthritis. RECOMMENDATION:   Radionuclide bone imaging versus MRI study of the foot advised. XR FOOT LEFT (MIN 3 VIEWS)   Final Result   Left ankle: No acute bony abnormality. Left foot: Demineralization on both sides of the 1st metatarsal-phalangeal   and D IP joints the great toe with soft tissue swelling. No cortical   destruction but findings may be compatible with osteomyelitis less likely   erosive osteoarthritis. RECOMMENDATION:   Radionuclide bone imaging versus MRI study of the foot advised. VL DUP LOWER EXTREMITY VENOUS BILATERAL    (Results Pending)       Assessment   Cathy Corea is a 39 y.o. male with   1. Septic joint 1st MPJ, left   2. Abscess, left leg  3. Acute left calf pain   4. IV drug use    Principal Problem:    Osteomyelitis ([address]  Active Problems:    Chronic right-sided low back pain with right-sided sciatica    Drug abuse ([address]    Chronic hepatitis C without hepatic coma (HCC)    Cocaine abuse ([address]    Methadone dependence ([address]    Pulmonary embolism (HCC)    Microcytic anemia  Resolved Problems:    * No resolved hospital problems. *       Plan     · Patient seen and evaluated at bedside. · Treatment options discussed in detail with the patient. · Radiographs reviewed  ? Negative for soft tissue emphysema, acute fracture/dislocation, foreign body, or osteomyelitis. There is demineralization of the proximal phalanx and 1st met head. · MRI of the left foot reviewed: suspicious for 1st MPJ septic joint, osteomyelitis of 1st met head and proximal phalanx and intraosseous abscess. · MSK ultrasound left lower extremity reviewed: No convincing evidence of abscess  · MRI of left tibia/fibula reviewed- organized fluid collection along superficial aspect of medial head of gastrocnemius  · Venous duplex of b/l LE ordered-results pending  · Internal medicine following as primary team  · Infectious disease following-recommend continuing vancomycin  · Bedside needle aspiration of left leg performed-see procedure note below. · Dressing applied to left leg after needle aspiration- DSD, ACE   · WBAT to Bilateral lower extremity.   · Due to failed attempt at bedside aspiration of abscess, patient consented to formal I and D in the OR with bone biopsy at the level of the 1st MTJ, left foot at 7:30 am, 12/20/21. Risks and benefits of procedure explained to patient, patient verbalized understanding. Consent signed and placed in chart. · Hold morning anticoagulation  · NPO after 11:30 pm tonight  · COVID negative  · Discussed with Dr. Karely Padilla. Procedure: Patient understands that needle aspiration for drainage of abscess of the left leg needs to be performed. Patient agrees to procedure. Risks and benefits explained to patient, consent signed and witnessed. Timeout performed with patient, RN, and writer in room. 20 cc of 1% lidocaine plain was used for a local block. Area was prepped with iodine and an 18 gauge needle was used to attempt to aspirate the abscess. Upon needle aspiration, approximately 0 cc of drainage expressed. Multiple attempts were tried with the same effect. Dressing applied of DSD and ACE to the left leg. Patient tolerated procedure well.         Angeline Quezada DPM   Podiatric Medicine & Surgery   12/19/2021 at 4:12 PM

## 2021-12-19 NOTE — PLAN OF CARE
Problem: Falls - Risk of:  Goal: Will remain free from falls  Description: Will remain free from falls  Outcome: Ongoing  Goal: Absence of physical injury  Description: Absence of physical injury  Outcome: Ongoing     Problem: Nutritional:  Goal: Nutritional status will improve  Description: Nutritional status will improve  Outcome: Ongoing     Problem: Physical Regulation:  Goal: Diagnostic test results will improve  Description: Diagnostic test results will improve  Outcome: Ongoing  Goal: Will remain free from infection  Description: Will remain free from infection  Outcome: Ongoing  Goal: Ability to maintain vital signs within normal range will improve  Description: Ability to maintain vital signs within normal range will improve  Outcome: Ongoing     Problem: Respiratory:  Goal: Ability to maintain normal respiratory secretions will improve  Description: Ability to maintain normal respiratory secretions will improve  Outcome: Ongoing     Problem: Skin Integrity:  Goal: Demonstration of wound healing without infection will improve  Description: Demonstration of wound healing without infection will improve  Outcome: Ongoing  Goal: Complications related to intravenous access or infusion will be avoided or minimized  Description: Complications related to intravenous access or infusion will be avoided or minimized  Outcome: Ongoing     Problem: Pain:  Goal: Pain level will decrease  Description: Pain level will decrease  Outcome: Ongoing  Goal: Control of acute pain  Description: Control of acute pain  Outcome: Ongoing  Goal: Control of chronic pain  Description: Control of chronic pain  Outcome: Ongoing

## 2021-12-19 NOTE — PROGRESS NOTES
Pt  said he's anxious can he have something for anxiety notified  and Susi Buenrostro ordered and given

## 2021-12-19 NOTE — PROGRESS NOTES
Dr here to do aspiration of left calf .  Attempted without success and drsg applied to left lower leg with acewrap and will have surgery tomorrow at 730am

## 2021-12-19 NOTE — PROGRESS NOTES
Occupational [address]  Occupational Therapy Not Seen Note    DATE: 2021    NAME: Montana Naik  MRN: [id number]   : 1985      Patient not seen this date for Occupational Therapy due to:    Testing: Pt awaiting LLE dopplers to r/o DVT. Next Scheduled Treatment: Attempt in pm or  as appropriate.     Electronically signed by Cesar Garcia OT on 2021 at 7:36 AM
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
## 2021-12-20 ENCOUNTER — ANESTHESIA (OUTPATIENT)
Dept: OPERATING ROOM | Age: 36
DRG: 317 | End: 2021-12-20
Payer: COMMERCIAL

## 2021-12-20 VITALS — OXYGEN SATURATION: 98 % | DIASTOLIC BLOOD PRESSURE: 67 MMHG | SYSTOLIC BLOOD PRESSURE: 117 MMHG | TEMPERATURE: 98.3 F

## 2021-12-20 LAB
ABSOLUTE EOS #: 0.34 K/UL (ref 0–0.44)
ABSOLUTE IMMATURE GRANULOCYTE: <0.03 K/UL (ref 0–0.3)
ABSOLUTE LYMPH #: 2 K/UL (ref 1.1–3.7)
ABSOLUTE MONO #: 0.49 K/UL (ref 0.1–1.2)
ALBUMIN SERPL-MCNC: 3.2 G/DL (ref 3.5–5.2)
ANION GAP SERPL CALCULATED.3IONS-SCNC: 12 MMOL/L (ref 9–17)
BASOPHILS # BLD: 1 % (ref 0–2)
BASOPHILS ABSOLUTE: 0.03 K/UL (ref 0–0.2)
BUN BLDV-MCNC: 18 MG/DL (ref 6–20)
BUN/CREAT BLD: ABNORMAL (ref 9–20)
CALCIUM SERPL-MCNC: 8.5 MG/DL (ref 8.6–10.4)
CHLORIDE BLD-SCNC: 100 MMOL/L (ref 98–107)
CO2: 26 MMOL/L (ref 20–31)
CREAT SERPL-MCNC: 0.66 MG/DL (ref 0.7–1.2)
DIFFERENTIAL TYPE: ABNORMAL
EOSINOPHILS RELATIVE PERCENT: 7 % (ref 1–4)
FOLATE: 4.8 NG/ML
GFR AFRICAN AMERICAN: >60 ML/MIN
GFR NON-AFRICAN AMERICAN: >60 ML/MIN
GFR SERPL CREATININE-BSD FRML MDRD: ABNORMAL ML/MIN/{1.73_M2}
GFR SERPL CREATININE-BSD FRML MDRD: ABNORMAL ML/MIN/{1.73_M2}
GLUCOSE BLD-MCNC: 90 MG/DL (ref 70–99)
HCT VFR BLD CALC: 35 % (ref 40.7–50.3)
HEMOGLOBIN: 10.2 G/DL (ref 13–17)
IMMATURE GRANULOCYTES: 0 %
IRON SATURATION: 11 % (ref 20–55)
IRON: 35 UG/DL (ref 59–158)
LYMPHOCYTES # BLD: 39 % (ref 24–43)
MAGNESIUM: 1.8 MG/DL (ref 1.6–2.6)
MCH RBC QN AUTO: 21 PG (ref 25.2–33.5)
MCHC RBC AUTO-ENTMCNC: 29.1 G/DL (ref 28.4–34.8)
MCV RBC AUTO: 72 FL (ref 82.6–102.9)
MONOCYTES # BLD: 10 % (ref 3–12)
NRBC AUTOMATED: 0 PER 100 WBC
PDW BLD-RTO: 16.5 % (ref 11.8–14.4)
PHOSPHORUS: 4.7 MG/DL (ref 2.5–4.5)
PLATELET # BLD: 190 K/UL (ref 138–453)
PLATELET ESTIMATE: ABNORMAL
PMV BLD AUTO: 8.9 FL (ref 8.1–13.5)
POTASSIUM SERPL-SCNC: 4.4 MMOL/L (ref 3.7–5.3)
RBC # BLD: 4.86 M/UL (ref 4.21–5.77)
RBC # BLD: ABNORMAL 10*6/UL
SEG NEUTROPHILS: 44 % (ref 36–65)
SEGMENTED NEUTROPHILS ABSOLUTE COUNT: 2.28 K/UL (ref 1.5–8.1)
SODIUM BLD-SCNC: 138 MMOL/L (ref 135–144)
TOTAL IRON BINDING CAPACITY: 312 UG/DL (ref 250–450)
UNSATURATED IRON BINDING CAPACITY: 277 UG/DL (ref 112–347)
VITAMIN B-12: 344 PG/ML (ref 232–1245)
WBC # BLD: 5.2 K/UL (ref 3.5–11.3)
WBC # BLD: ABNORMAL 10*3/UL

## 2021-12-20 PROCEDURE — 0QBR3ZX EXCISION OF LEFT TOE PHALANX, PERCUTANEOUS APPROACH, DIAGNOSTIC: ICD-10-PCS | Performed by: PODIATRIST

## 2021-12-20 PROCEDURE — 83540 ASSAY OF IRON: CPT

## 2021-12-20 PROCEDURE — 6360000002 HC RX W HCPCS: Performed by: ANESTHESIOLOGY

## 2021-12-20 PROCEDURE — 88307 TISSUE EXAM BY PATHOLOGIST: CPT

## 2021-12-20 PROCEDURE — 6360000002 HC RX W HCPCS: Performed by: NURSE ANESTHETIST, CERTIFIED REGISTERED

## 2021-12-20 PROCEDURE — 87070 CULTURE OTHR SPECIMN AEROBIC: CPT

## 2021-12-20 PROCEDURE — 0J9P0ZZ DRAINAGE OF LEFT LOWER LEG SUBCUTANEOUS TISSUE AND FASCIA, OPEN APPROACH: ICD-10-PCS | Performed by: PODIATRIST

## 2021-12-20 PROCEDURE — 82607 VITAMIN B-12: CPT

## 2021-12-20 PROCEDURE — 2580000003 HC RX 258: Performed by: PODIATRIST

## 2021-12-20 PROCEDURE — 7100000000 HC PACU RECOVERY - FIRST 15 MIN: Performed by: PODIATRIST

## 2021-12-20 PROCEDURE — 2580000003 HC RX 258: Performed by: NURSE ANESTHETIST, CERTIFIED REGISTERED

## 2021-12-20 PROCEDURE — 88311 DECALCIFY TISSUE: CPT

## 2021-12-20 PROCEDURE — 3600000014 HC SURGERY LEVEL 4 ADDTL 15MIN: Performed by: PODIATRIST

## 2021-12-20 PROCEDURE — 3600000004 HC SURGERY LEVEL 4 BASE: Performed by: PODIATRIST

## 2021-12-20 PROCEDURE — 6360000002 HC RX W HCPCS: Performed by: NURSE PRACTITIONER

## 2021-12-20 PROCEDURE — 99232 SBSQ HOSP IP/OBS MODERATE 35: CPT | Performed by: INTERNAL MEDICINE

## 2021-12-20 PROCEDURE — 83550 IRON BINDING TEST: CPT

## 2021-12-20 PROCEDURE — 6370000000 HC RX 637 (ALT 250 FOR IP): Performed by: INTERNAL MEDICINE

## 2021-12-20 PROCEDURE — 2500000003 HC RX 250 WO HCPCS: Performed by: PODIATRIST

## 2021-12-20 PROCEDURE — 83735 ASSAY OF MAGNESIUM: CPT

## 2021-12-20 PROCEDURE — 6360000002 HC RX W HCPCS: Performed by: INTERNAL MEDICINE

## 2021-12-20 PROCEDURE — 0JBP0ZZ EXCISION OF LEFT LOWER LEG SUBCUTANEOUS TISSUE AND FASCIA, OPEN APPROACH: ICD-10-PCS | Performed by: PODIATRIST

## 2021-12-20 PROCEDURE — 87205 SMEAR GRAM STAIN: CPT

## 2021-12-20 PROCEDURE — 6360000002 HC RX W HCPCS: Performed by: STUDENT IN AN ORGANIZED HEALTH CARE EDUCATION/TRAINING PROGRAM

## 2021-12-20 PROCEDURE — 6370000000 HC RX 637 (ALT 250 FOR IP): Performed by: STUDENT IN AN ORGANIZED HEALTH CARE EDUCATION/TRAINING PROGRAM

## 2021-12-20 PROCEDURE — 2580000003 HC RX 258: Performed by: STUDENT IN AN ORGANIZED HEALTH CARE EDUCATION/TRAINING PROGRAM

## 2021-12-20 PROCEDURE — 88304 TISSUE EXAM BY PATHOLOGIST: CPT

## 2021-12-20 PROCEDURE — 2700000000 HC OXYGEN THERAPY PER DAY

## 2021-12-20 PROCEDURE — 85025 COMPLETE CBC W/AUTO DIFF WBC: CPT

## 2021-12-20 PROCEDURE — 2500000003 HC RX 250 WO HCPCS: Performed by: NURSE ANESTHETIST, CERTIFIED REGISTERED

## 2021-12-20 PROCEDURE — 80069 RENAL FUNCTION PANEL: CPT

## 2021-12-20 PROCEDURE — 87075 CULTR BACTERIA EXCEPT BLOOD: CPT

## 2021-12-20 PROCEDURE — 7100000001 HC PACU RECOVERY - ADDTL 15 MIN: Performed by: PODIATRIST

## 2021-12-20 PROCEDURE — 1200000000 HC SEMI PRIVATE

## 2021-12-20 PROCEDURE — 3700000000 HC ANESTHESIA ATTENDED CARE: Performed by: PODIATRIST

## 2021-12-20 PROCEDURE — 3700000001 HC ADD 15 MINUTES (ANESTHESIA): Performed by: PODIATRIST

## 2021-12-20 PROCEDURE — 2580000003 HC RX 258: Performed by: ANESTHESIOLOGY

## 2021-12-20 PROCEDURE — 94761 N-INVAS EAR/PLS OXIMETRY MLT: CPT

## 2021-12-20 PROCEDURE — 2709999900 HC NON-CHARGEABLE SUPPLY: Performed by: PODIATRIST

## 2021-12-20 PROCEDURE — 0QBP3ZX EXCISION OF LEFT METATARSAL, PERCUTANEOUS APPROACH, DIAGNOSTIC: ICD-10-PCS | Performed by: PODIATRIST

## 2021-12-20 PROCEDURE — 82746 ASSAY OF FOLIC ACID SERUM: CPT

## 2021-12-20 PROCEDURE — 36415 COLL VENOUS BLD VENIPUNCTURE: CPT

## 2021-12-20 RX ORDER — SODIUM CHLORIDE, SODIUM LACTATE, POTASSIUM CHLORIDE, CALCIUM CHLORIDE 600; 310; 30; 20 MG/100ML; MG/100ML; MG/100ML; MG/100ML
INJECTION, SOLUTION INTRAVENOUS CONTINUOUS
Status: DISCONTINUED | OUTPATIENT
Start: 2021-12-20 | End: 2021-12-20

## 2021-12-20 RX ORDER — LIDOCAINE HYDROCHLORIDE 10 MG/ML
INJECTION, SOLUTION EPIDURAL; INFILTRATION; INTRACAUDAL; PERINEURAL PRN
Status: DISCONTINUED | OUTPATIENT
Start: 2021-12-20 | End: 2021-12-20 | Stop reason: SDUPTHER

## 2021-12-20 RX ORDER — LIDOCAINE HYDROCHLORIDE 10 MG/ML
INJECTION, SOLUTION EPIDURAL; INFILTRATION; INTRACAUDAL; PERINEURAL PRN
Status: DISCONTINUED | OUTPATIENT
Start: 2021-12-20 | End: 2021-12-20 | Stop reason: ALTCHOICE

## 2021-12-20 RX ORDER — BUPIVACAINE HYDROCHLORIDE 5 MG/ML
INJECTION, SOLUTION EPIDURAL; INTRACAUDAL PRN
Status: DISCONTINUED | OUTPATIENT
Start: 2021-12-20 | End: 2021-12-20 | Stop reason: ALTCHOICE

## 2021-12-20 RX ORDER — SODIUM CHLORIDE, SODIUM LACTATE, POTASSIUM CHLORIDE, CALCIUM CHLORIDE 600; 310; 30; 20 MG/100ML; MG/100ML; MG/100ML; MG/100ML
INJECTION, SOLUTION INTRAVENOUS CONTINUOUS PRN
Status: DISCONTINUED | OUTPATIENT
Start: 2021-12-20 | End: 2021-12-20 | Stop reason: SDUPTHER

## 2021-12-20 RX ORDER — METHADONE HYDROCHLORIDE 5 MG/5ML
100 SOLUTION ORAL ONCE
Status: COMPLETED | OUTPATIENT
Start: 2021-12-20 | End: 2021-12-20

## 2021-12-20 RX ORDER — ROCURONIUM BROMIDE 10 MG/ML
INJECTION, SOLUTION INTRAVENOUS PRN
Status: DISCONTINUED | OUTPATIENT
Start: 2021-12-20 | End: 2021-12-20 | Stop reason: SDUPTHER

## 2021-12-20 RX ORDER — NEOSTIGMINE METHYLSULFATE 5 MG/5 ML
SYRINGE (ML) INTRAVENOUS PRN
Status: DISCONTINUED | OUTPATIENT
Start: 2021-12-20 | End: 2021-12-20 | Stop reason: SDUPTHER

## 2021-12-20 RX ORDER — MORPHINE SULFATE 2 MG/ML
2 INJECTION, SOLUTION INTRAMUSCULAR; INTRAVENOUS
Status: DISCONTINUED | OUTPATIENT
Start: 2021-12-20 | End: 2021-12-21 | Stop reason: HOSPADM

## 2021-12-20 RX ORDER — FENTANYL CITRATE 50 UG/ML
INJECTION, SOLUTION INTRAMUSCULAR; INTRAVENOUS PRN
Status: DISCONTINUED | OUTPATIENT
Start: 2021-12-20 | End: 2021-12-20 | Stop reason: SDUPTHER

## 2021-12-20 RX ORDER — PROPOFOL 10 MG/ML
INJECTION, EMULSION INTRAVENOUS PRN
Status: DISCONTINUED | OUTPATIENT
Start: 2021-12-20 | End: 2021-12-20 | Stop reason: SDUPTHER

## 2021-12-20 RX ORDER — GLYCOPYRROLATE 1 MG/5 ML
SYRINGE (ML) INTRAVENOUS PRN
Status: DISCONTINUED | OUTPATIENT
Start: 2021-12-20 | End: 2021-12-20 | Stop reason: SDUPTHER

## 2021-12-20 RX ORDER — MAGNESIUM HYDROXIDE 1200 MG/15ML
LIQUID ORAL CONTINUOUS PRN
Status: COMPLETED | OUTPATIENT
Start: 2021-12-20 | End: 2021-12-20

## 2021-12-20 RX ORDER — FENTANYL CITRATE 50 UG/ML
50 INJECTION, SOLUTION INTRAMUSCULAR; INTRAVENOUS EVERY 5 MIN PRN
Status: COMPLETED | OUTPATIENT
Start: 2021-12-20 | End: 2021-12-20

## 2021-12-20 RX ORDER — ONDANSETRON 2 MG/ML
INJECTION INTRAMUSCULAR; INTRAVENOUS PRN
Status: DISCONTINUED | OUTPATIENT
Start: 2021-12-20 | End: 2021-12-20 | Stop reason: SDUPTHER

## 2021-12-20 RX ORDER — DEXAMETHASONE SODIUM PHOSPHATE 10 MG/ML
INJECTION INTRAMUSCULAR; INTRAVENOUS PRN
Status: DISCONTINUED | OUTPATIENT
Start: 2021-12-20 | End: 2021-12-20 | Stop reason: SDUPTHER

## 2021-12-20 RX ORDER — MORPHINE SULFATE 4 MG/ML
4 INJECTION, SOLUTION INTRAMUSCULAR; INTRAVENOUS
Status: DISCONTINUED | OUTPATIENT
Start: 2021-12-20 | End: 2021-12-21 | Stop reason: HOSPADM

## 2021-12-20 RX ORDER — FENTANYL CITRATE 50 UG/ML
25 INJECTION, SOLUTION INTRAMUSCULAR; INTRAVENOUS EVERY 5 MIN PRN
Status: DISCONTINUED | OUTPATIENT
Start: 2021-12-20 | End: 2021-12-20 | Stop reason: HOSPADM

## 2021-12-20 RX ADMIN — Medication 4 MG: at 08:24

## 2021-12-20 RX ADMIN — GABAPENTIN 800 MG: 800 TABLET ORAL at 14:06

## 2021-12-20 RX ADMIN — FENTANYL CITRATE 50 MCG: 50 INJECTION INTRAMUSCULAR; INTRAVENOUS at 09:14

## 2021-12-20 RX ADMIN — ONDANSETRON 4 MG: 2 INJECTION INTRAMUSCULAR; INTRAVENOUS at 23:23

## 2021-12-20 RX ADMIN — MORPHINE SULFATE 4 MG: 4 INJECTION INTRAVENOUS at 23:22

## 2021-12-20 RX ADMIN — FENTANYL CITRATE 50 MCG: 50 INJECTION INTRAMUSCULAR; INTRAVENOUS at 09:23

## 2021-12-20 RX ADMIN — FENTANYL CITRATE 50 MCG: 50 INJECTION INTRAMUSCULAR; INTRAVENOUS at 09:04

## 2021-12-20 RX ADMIN — FAMOTIDINE 20 MG: 20 TABLET, FILM COATED ORAL at 23:21

## 2021-12-20 RX ADMIN — Medication 0.8 MG: at 08:24

## 2021-12-20 RX ADMIN — SODIUM CHLORIDE, PRESERVATIVE FREE 10 ML: 5 INJECTION INTRAVENOUS at 23:21

## 2021-12-20 RX ADMIN — MORPHINE SULFATE 4 MG: 4 INJECTION INTRAVENOUS at 12:57

## 2021-12-20 RX ADMIN — PROPOFOL 170 MG: 10 INJECTION, EMULSION INTRAVENOUS at 07:36

## 2021-12-20 RX ADMIN — VANCOMYCIN HYDROCHLORIDE 1000 MG: 1 INJECTION, SOLUTION INTRAVENOUS at 00:22

## 2021-12-20 RX ADMIN — GABAPENTIN 800 MG: 800 TABLET ORAL at 23:18

## 2021-12-20 RX ADMIN — MORPHINE SULFATE 4 MG: 4 INJECTION INTRAVENOUS at 17:38

## 2021-12-20 RX ADMIN — VANCOMYCIN HYDROCHLORIDE 1000 MG: 1 INJECTION, SOLUTION INTRAVENOUS at 16:04

## 2021-12-20 RX ADMIN — SODIUM CHLORIDE, POTASSIUM CHLORIDE, SODIUM LACTATE AND CALCIUM CHLORIDE: 600; 310; 30; 20 INJECTION, SOLUTION INTRAVENOUS at 07:19

## 2021-12-20 RX ADMIN — SODIUM CHLORIDE, POTASSIUM CHLORIDE, SODIUM LACTATE AND CALCIUM CHLORIDE: 600; 310; 30; 20 INJECTION, SOLUTION INTRAVENOUS at 07:29

## 2021-12-20 RX ADMIN — METHADONE HYDROCHLORIDE 100 MG: 5 SOLUTION ORAL at 11:08

## 2021-12-20 RX ADMIN — BACLOFEN 20 MG: 10 TABLET ORAL at 14:06

## 2021-12-20 RX ADMIN — ROCURONIUM BROMIDE 50 MG: 10 INJECTION INTRAVENOUS at 07:36

## 2021-12-20 RX ADMIN — FENTANYL CITRATE 100 MCG: 50 INJECTION, SOLUTION INTRAMUSCULAR; INTRAVENOUS at 07:36

## 2021-12-20 RX ADMIN — DEXAMETHASONE SODIUM PHOSPHATE 10 MG: 10 INJECTION INTRAMUSCULAR; INTRAVENOUS at 07:54

## 2021-12-20 RX ADMIN — VANCOMYCIN HYDROCHLORIDE 1000 MG: 1 INJECTION, SOLUTION INTRAVENOUS at 07:44

## 2021-12-20 RX ADMIN — VANCOMYCIN HYDROCHLORIDE 1000 MG: 1 INJECTION, SOLUTION INTRAVENOUS at 23:14

## 2021-12-20 RX ADMIN — BACLOFEN 20 MG: 10 TABLET ORAL at 23:17

## 2021-12-20 RX ADMIN — APIXABAN 5 MG: 5 TABLET, FILM COATED ORAL at 23:25

## 2021-12-20 RX ADMIN — LIDOCAINE HYDROCHLORIDE 50 MG: 10 INJECTION, SOLUTION EPIDURAL; INFILTRATION; INTRACAUDAL at 07:36

## 2021-12-20 RX ADMIN — FENTANYL CITRATE 50 MCG: 50 INJECTION INTRAMUSCULAR; INTRAVENOUS at 09:30

## 2021-12-20 RX ADMIN — ONDANSETRON 4 MG: 2 INJECTION INTRAMUSCULAR; INTRAVENOUS at 08:12

## 2021-12-20 ASSESSMENT — PULMONARY FUNCTION TESTS
PIF_VALUE: 0
PIF_VALUE: 1
PIF_VALUE: 21
PIF_VALUE: 4
PIF_VALUE: 19
PIF_VALUE: 21
PIF_VALUE: 15
PIF_VALUE: 21
PIF_VALUE: 5
PIF_VALUE: 21
PIF_VALUE: 19
PIF_VALUE: 17
PIF_VALUE: 19
PIF_VALUE: 19
PIF_VALUE: 21
PIF_VALUE: 19
PIF_VALUE: 1
PIF_VALUE: 17
PIF_VALUE: 20
PIF_VALUE: 20
PIF_VALUE: 0
PIF_VALUE: 19
PIF_VALUE: 8
PIF_VALUE: 19
PIF_VALUE: 0
PIF_VALUE: 21
PIF_VALUE: 23
PIF_VALUE: 14
PIF_VALUE: 19
PIF_VALUE: 8
PIF_VALUE: 0
PIF_VALUE: 20
PIF_VALUE: 21
PIF_VALUE: 19
PIF_VALUE: 0
PIF_VALUE: 21
PIF_VALUE: 19
PIF_VALUE: 1
PIF_VALUE: 21
PIF_VALUE: 1
PIF_VALUE: 2
PIF_VALUE: 2
PIF_VALUE: 19
PIF_VALUE: 21
PIF_VALUE: 0
PIF_VALUE: 11
PIF_VALUE: 19
PIF_VALUE: 19
PIF_VALUE: 21
PIF_VALUE: 20
PIF_VALUE: 21
PIF_VALUE: 19
PIF_VALUE: 2
PIF_VALUE: 21
PIF_VALUE: 19
PIF_VALUE: 19
PIF_VALUE: 0
PIF_VALUE: 19
PIF_VALUE: 11
PIF_VALUE: 19
PIF_VALUE: 13
PIF_VALUE: 19
PIF_VALUE: 2
PIF_VALUE: 0
PIF_VALUE: 19
PIF_VALUE: 17
PIF_VALUE: 21
PIF_VALUE: 25
PIF_VALUE: 0
PIF_VALUE: 6
PIF_VALUE: 19
PIF_VALUE: 17
PIF_VALUE: 7
PIF_VALUE: 19
PIF_VALUE: 0
PIF_VALUE: 0
PIF_VALUE: 3
PIF_VALUE: 19
PIF_VALUE: 15
PIF_VALUE: 19
PIF_VALUE: 19
PIF_VALUE: 21
PIF_VALUE: 19

## 2021-12-20 ASSESSMENT — PAIN SCALES - GENERAL
PAINLEVEL_OUTOF10: 10
PAINLEVEL_OUTOF10: 8
PAINLEVEL_OUTOF10: 10
PAINLEVEL_OUTOF10: 9
PAINLEVEL_OUTOF10: 3
PAINLEVEL_OUTOF10: 8
PAINLEVEL_OUTOF10: 10
PAINLEVEL_OUTOF10: 9
PAINLEVEL_OUTOF10: 6
PAINLEVEL_OUTOF10: 10

## 2021-12-20 ASSESSMENT — PAIN DESCRIPTION - DESCRIPTORS
DESCRIPTORS: ACHING;DISCOMFORT
DESCRIPTORS: CONSTANT

## 2021-12-20 ASSESSMENT — PAIN DESCRIPTION - PAIN TYPE
TYPE: SURGICAL PAIN
TYPE: SURGICAL PAIN

## 2021-12-20 ASSESSMENT — PAIN DESCRIPTION - LOCATION
LOCATION: LEG
LOCATION: LEG

## 2021-12-20 ASSESSMENT — PAIN DESCRIPTION - FREQUENCY: FREQUENCY: CONTINUOUS

## 2021-12-20 ASSESSMENT — PAIN DESCRIPTION - ONSET: ONSET: ON-GOING

## 2021-12-20 ASSESSMENT — PAIN - FUNCTIONAL ASSESSMENT: PAIN_FUNCTIONAL_ASSESSMENT: 0-10

## 2021-12-20 ASSESSMENT — PAIN DESCRIPTION - ORIENTATION
ORIENTATION: LEFT
ORIENTATION: LEFT
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
## 2021-12-20 NOTE — BRIEF OP NOTE
PODIATRY BRIEF OP NOTE    PATIENT NAME: Star Hernandez DATE: 1985  -  39 y.o. male  MRN: [id number]  DATE: 12/20/2021  BILLING #: [id number]    Surgeon(s):  Theresa Stein DPM     ASSISTANTS: Toya Saravia DPM    PRE-OP DIAGNOSIS:   1. Abscess, left leg  2. Soft tissue mass, left leg  3. Bone lesion, first metatarsal, left foot  4. Bone lesion, proximal phalanx of hallux, left foot    POST-OP DIAGNOSIS: Same as above. PROCEDURE:   1. Incision and drainage, single incision, below the fascial plains  2. Bone biopsy x1, first metatarsal, left foot  3. Bone biopsy x1, proximal phalanx of hallux, left foot    ANESTHESIA: General with local    HEMOSTASIS: Pneumatic thigh tourniquet @300 mmHg for 47 minutes. ESTIMATED BLOOD LOSS: Less than 10 cc.     MATERIALS: 3-0 Monocryl, 3-0 nylon  * No implants in log *    INJECTABLES: 10 cc of one-to-one mix of 0.5% Marcaine plain 1% lidocaine plain preoperatively and 20 cc of 0.5% Marcaine plain postoperatively    SPECIMEN:   ID Type Source Tests Collected by Time Destination   1 : SOFT TISSUE MASS LEFT MEDIAL LOWER LEG Swab Leg CULTURE, ANAEROBIC AND AEROBIC Yang  [place], [facility] 12/20/2021 0825    A : FIRST METATARSAL Bone Transmetatarsal SURGICAL PATHOLOGY, CULTURE, TISSUE Yang  [place], [facility] 12/20/2021 0813    B : PROXIMAL PHALANX HALLEX Bone Foot SURGICAL PATHOLOGY, CULTURE, TISSUE Yang  [place], [facility] 12/20/2021 0844    C : SOFT TISSUE MASS LEFT MEDIAL LOWER LEG Tissue Leg SURGICAL PATHOLOGY, CULTURE, TISSUE Yang  [place], [facility] 12/20/2021 7166    D : FIRST METATARSAL Bone Foot SURGICAL PATHOLOGY Yang  [place], [facility] 12/20/2021 0826    E : PROXIMAL PHALANX HALLEX Bone Foot SURGICAL PATHOLOGY Theresa Stein, MARIKA 12/20/2021 1346        COMPLICATIONS: None    FINDINGS: Large necrotic soft tissue mass found below the level of superficial fascia of the medial left leg, superficial to the deep fascia and medial head of the gastrocnemius muscle. No purulence expressed. Soft bone noted when obtaining bone biopsy from first metatarsal head and proximal phalanx of left hallux. The patient was counseled at length about the risks of donn Covid-19 during their perioperative period and any recovery window from their procedure. The patient was made aware that donn Covid-19  may worsen their prognosis for recovering from their procedure  and lend to a higher morbidity and/or mortality risk. All material risks, benefits, and reasonable alternatives including postponing the procedure were discussed. The patient does wish to proceed with the procedure at this time.     Nilton Kate DPM   Podiatric Medicine & Surgery   12/20/2021 at 8:59 AM

## 2021-12-20 NOTE — PROGRESS NOTES
Progress Note  Podiatric Medicine and Surgery     Subjective     CC: Left lower extremity pain    Patient seen and examined in preoperative area  No acute events overnight. Afebrile, vital signs stable     HPI :  Vinita Oconnor is a 39 y.o. male seen at [facility]. [facility] for pain to the left foot and left leg. The patient states that he has a history of cardiac surgery this year. Patient states that recently when he awoke one morning his left calf was swollen, painful, and felt like a mark horse--he was unable to ambulate well. The patient went to multiple EDs for evaluation but left all AMA--he reports \"an abscess of the left calf and septic emboli\" were found on imaging, however, the patient did not receive treatment. He does admit to using IV cocaine and the last instance was 2 weeks ago. He denies any open wounds.  are at bedside, patient is incarcerated. ROS: Denies N/V/F/C/SOB/CP. Otherwise negative except at stated in the HPI.      Medications:  Scheduled Meds:   [MAR Hold] lidocaine PF  20 mL IntraDERmal Once    [MAR Hold] vancomycin  1,000 mg IntraVENous Q8H    [MAR Hold] famotidine  20 mg Oral BID    [MAR Hold] ketorolac  30 mg IntraVENous Once    [MAR Hold] enoxaparin  1 mg/kg SubCUTAneous BID    [MAR Hold] methadone  100 mg Oral Daily    [MAR Hold] venlafaxine  75 mg Oral Daily with breakfast    [MAR Hold] vancomycin (VANCOCIN) intermittent dosing (placeholder)   Other RX Placeholder    [Held by provider] apixaban  5 mg Oral BID    [MAR Hold] sodium chloride flush  5-40 mL IntraVENous 2 times per day    [MAR Hold] gabapentin  800 mg Oral TID    [MAR Hold] baclofen  20 mg Oral TID       Continuous Infusions:   lactated ringers      [MAR Hold] sodium chloride 25 mL (12/19/21 9809)       PRN Meds:fentanNYL, fentanNYL, [MAR Hold] sodium chloride flush, [MAR Hold] sodium chloride flush, [MAR Hold] sodium chloride flush, [MAR Hold] sodium chloride, [MAR Hold] potassium chloride **OR** [MAR Hold] potassium alternative oral replacement **OR** [MAR Hold] potassium chloride, [MAR Hold] magnesium sulfate, [MAR Hold] ondansetron **OR** [MAR Hold] ondansetron, [MAR Hold] polyethylene glycol, [MAR Hold] acetaminophen **OR** [MAR Hold] acetaminophen    Objective     Vitals:  Patient Vitals for the past 8 hrs:   BP Temp Temp src Pulse Resp SpO2 Height Weight   21 0630 113/67 97.5 °F (36.4 °C) Temporal 81 16 96 % 5' 8\" (1.727 m) 175 lb (79.4 kg)   21 0609    84       21 0412    82       21 0156    82       21 0027    90       21 0022    91         Average, Min, and Max for last 24 hours Vitals:  TEMPERATURE:  Temp  Av.8 °F (36.6 °C)  Min: 97.5 °F (36.4 °C)  Max: 98.1 °F (36.7 °C)    RESPIRATIONS RANGE: Resp  Av  Min: 16  Max: 16    PULSE RANGE: Pulse  Av.5  Min: 81  Max: 94    BLOOD PRESSURE RANGE:  Systolic (65YIO), TAWANDA:061 , Min:113 , XCK:242   ; Diastolic (34EEM), LMO:30, Min:67, Max:77      PULSE OXIMETRY RANGE: SpO2  Av %  Min: 96 %  Max: 98 %    I/O last 3 completed shifts: In: 443 [P.O.:120; I.V.:10; IV Piggyback:313]  Out: 4724 [Urine:2725]    CBC:  Recent Labs     21  0600 21  0526 21  0423   WBC 6.3 5.2 5.2   HGB 11.1* 10.3* 10.2*   HCT 38.9* 37.3* 35.0*    183 190        BMP:  Recent Labs     21  0600 21  0526 21  0423    135 138   K 4.3 4.3 4.4    100 100   CO2 25 26 26   BUN 9 17 18   CREATININE 0.50* 0.55* 0.66*   GLUCOSE 91 93 90   CALCIUM 8.4* 8.3* 8.5*        Coags:  No results for input(s): APTT, PROT, INR in the last 72 hours. Lab Results   Component Value Date    SEDRATE 31 (H) 2021     No results for input(s): CRP in the last 72 hours. Lower Extremity Physical Exam:    Vascular: DP and PT pulses are Palpable . CFT <3 seconds to all digits. Hair growth is present to the level of the digits.   Moderate non-pitting edema to the left lower extremity.       Neuro: Saph/sural/SP/DP/plantar sensation intact to light touch.     Musculoskeletal: Muscle strength is 5/5 to all lower extremity muscle groups on the right, deferred on the left because of increased pain to calf and foot. Gross deformity is absent. TTP of calf and 1st MPJ, left. Minimal pain with left 1st MPJ ROM. Compartments of LLE taut but compressible.      Dermatologic: No open wounds, drainage, malodor, or erythema. No crepitus, fluctuance, or associated in warmth. Interdigital maceration absent. Imaging:   XR CHEST PORTABLE   Final Result   Stable chest when compared to the prior exam         MRI TIBIA FIBULA LEFT W WO CONTRAST   Final Result   1. Large organized collection along the superficial aspect of the medial head   of the gastrocnemius. The collection measures 1.8 x 5.8 x 18.7 cm and does   demonstrate peripheral enhancement. While sterility of fluid is   indeterminate on imaging, abscess cannot be excluded. 2. No osteomyelitis identified. VL DUP LOWER EXTREMITY VENOUS BILATERAL   Final Result      US EXTREMITY LEFT NON VASC LIMITED   Final Result   No convincing sonographic findings typical of abscess. Calf muscle is mildly heterogeneous and hypoechoic suggesting possible   inflammation/edema, injury, or myositis versus complex collection adjacent to   the muscle. MRI is recommended for further evaluation. MRI FOOT LEFT W WO CONTRAST   Final Result   1. Soft tissue edema and enhancement about the great toe most suggestive of   cellulitis. Underlying moderate 1st MTP joint effusion and osteomyelitis of   the proximal phalanx and 1st metatarsal with findings concerning for septic   joint. Intraosseous abscess present the proximal phalanx and 1st metatarsal   head. Edema and enhancement extend to the 1st metatarsal base.       RECOMMENDATIONS:   Unavailable         XR CHEST PORTABLE   Final Result   Patchy bilateral nodular opacities in the lungs more in the left base. Consider follow-up CT         XR TIBIA FIBULA LEFT (2 VIEWS)   Final Result   No acute osseous abnormality. XR ANKLE LEFT (MIN 3 VIEWS)   Final Result   Left ankle: No acute bony abnormality. Left foot: Demineralization on both sides of the 1st metatarsal-phalangeal   and D IP joints the great toe with soft tissue swelling. No cortical   destruction but findings may be compatible with osteomyelitis less likely   erosive osteoarthritis. RECOMMENDATION:   Radionuclide bone imaging versus MRI study of the foot advised. XR FOOT LEFT (MIN 3 VIEWS)   Final Result   Left ankle: No acute bony abnormality. Left foot: Demineralization on both sides of the 1st metatarsal-phalangeal   and D IP joints the great toe with soft tissue swelling. No cortical   destruction but findings may be compatible with osteomyelitis less likely   erosive osteoarthritis. RECOMMENDATION:   Radionuclide bone imaging versus MRI study of the foot advised. Assessment   Luz Coughlin is a 39 y.o. male with   1. Septic joint 1st MPJ, left   2. Abscess, left leg  3. Acute left calf pain   4. IV drug use    Principal Problem:    Osteomyelitis ([address]  Active Problems:    Chronic right-sided low back pain with right-sided sciatica    Drug abuse ([address]    Chronic hepatitis C without hepatic coma (HCC)    Cocaine abuse ([address]    Methadone dependence ([address]    Pulmonary embolism (HCC)    Microcytic anemia  Resolved Problems:    * No resolved hospital problems. *       Plan     · Patient seen and evaluated at bedside. · Treatment options discussed in detail with the patient. · Radiographs reviewed  ? Negative for soft tissue emphysema, acute fracture/dislocation, foreign body, or osteomyelitis. There is demineralization of the proximal phalanx and 1st met head.    · MRI of the left foot reviewed: suspicious for 1st MPJ septic joint, osteomyelitis of 1st met head and proximal phalanx and intraosseous abscess. · MSK ultrasound left lower extremity reviewed: No convincing evidence of abscess  · MRI of left tibia/fibula reviewed- organized fluid collection along superficial aspect of medial head of gastrocnemius  · Venous duplex of b/l LE ordered-no evidence of superficial or deep venous thrombosis in bilateral lower extremities  · Internal medicine following as primary team  · Infectious disease following-recommend continuing vancomycin  · Bedside needle aspiration of left leg performed-see procedure note below. · Dressing applied to left leg after needle aspiration- DSD, ACE   · WBAT to Bilateral lower extremity. · Patient scheduled for surgery today at 7:30 AM for formal I and D in the OR with bone biopsy at the level of the 1st MTJ, left foot at 7:30 am, 12/20/21. Consent signed and placed in chart. · Morning anticoagulation held  · Patient currently NPO  · COVID negative  · Discussed with Dr. Kaitlynn Philip.       Masha Robles DPM   Podiatric Medicine & Surgery   12/20/2021 at 7:12 AM

## 2021-12-20 NOTE — ANESTHESIA POSTPROCEDURE EVALUATION
Department of Anesthesiology  Postprocedure Note    Patient: Ashwin Meredith  MRN: [id number]  [place]: 1985  Date of evaluation: 12/20/2021  Time:  1:23 PM     Procedure Summary     Date: 12/20/21 Room / Location: [facility] / [facility]    Anesthesia Start: 8326 Anesthesia Stop: 4724    Procedure: LEFT LEG INCISION AND DEBRIDEMENT, BONE BIOPSY LEFT FOOT (Left ) Diagnosis: (LEFT FOOT INFECTION)    Surgeons: Caroline Tamayo DPM Responsible Provider: Claus Carrion MD    Anesthesia Type: general ASA Status: 3          Anesthesia Type: general    Kaylee Phase I: Kaylee Score: 9    Kaylee Phase II:      Last vitals: Reviewed and per EMR flowsheets.        Anesthesia Post Evaluation    Patient location during evaluation: PACU  Patient participation: complete - patient participated  Level of consciousness: awake and alert  Pain score: 2  Airway patency: patent  Nausea & Vomiting: no nausea and no vomiting  Complications: no  Cardiovascular status: hemodynamically stable  Respiratory status: acceptable  Hydration status: euvolemic

## 2021-12-20 NOTE — PROGRESS NOTES
Physician Progress Note      PATIENT:               Cherylene Lynch  CSN #:                  [id number]  :                       1985  ADMIT DATE:       2021 2:06 PM  [address] [place] DATE:  RESPONDING  PROVIDER #:        Marie Joyce        QUERY TEXT:    Stage of Chronic Kidney Disease: Please provide further specificity, if known. Clinical indicators include: bun, creatinine, probnp, ckd, scr, cr, chronic   kidney disease  Options provided:  -- Chronic kidney disease stage 1  -- Chronic kidney disease stage 2  -- Chronic kidney disease stage 3  -- Chronic kidney disease stage 3a  -- Chronic kidney disease stage 3b  -- Chronic kidney disease stage 4  -- Chronic kidney disease stage 5  -- Chronic kidney disease stage 5, requiring dialysis  -- End stage renal disease  -- Other - I will add my own diagnosis  -- Disagree - Not applicable / Not valid  -- Disagree - Clinically Unable to determine / Unknown        PROVIDER RESPONSE TEXT:    Provider was unable to determine a response for this query. Electronically signed by:   Marie Joyce 2021 3:59 PM
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
## 2021-12-20 NOTE — PLAN OF CARE
Problem: Falls - Risk of:  Goal: Will remain free from falls  Description: Will remain free from falls  12/20/2021 1806 by Caryle Kaplan, RN  Outcome: Ongoing  12/20/2021 0411 by Macho Gómez RN  Outcome: Ongoing  Goal: Absence of physical injury  Description: Absence of physical injury  12/20/2021 1806 by Caryle Kaplan, RN  Outcome: Ongoing  12/20/2021 0411 by Macho Gómez RN  Outcome: Ongoing     Problem: Nutritional:  Goal: Nutritional status will improve  Description: Nutritional status will improve  12/20/2021 1806 by Caryle Kaplan, RN  Outcome: Ongoing  12/20/2021 0411 by Macho Gómez RN  Outcome: Ongoing     Problem: Physical Regulation:  Goal: Diagnostic test results will improve  Description: Diagnostic test results will improve  12/20/2021 1806 by Caryle Kaplan, RN  Outcome: Ongoing  12/20/2021 0411 by Macho Gómez RN  Outcome: Ongoing  Goal: Will remain free from infection  Description: Will remain free from infection  12/20/2021 1806 by Caryle Kaplan, RN  Outcome: Ongoing  12/20/2021 0411 by Macho Gómez RN  Outcome: Ongoing  Goal: Ability to maintain vital signs within normal range will improve  Description: Ability to maintain vital signs within normal range will improve  12/20/2021 1806 by Caryle Kaplan, RN  Outcome: Ongoing  12/20/2021 0411 by Macho Gómez RN  Outcome: Ongoing     Problem: Respiratory:  Goal: Ability to maintain normal respiratory secretions will improve  Description: Ability to maintain normal respiratory secretions will improve  12/20/2021 1806 by Caryle Kaplan, RN  Outcome: Ongoing  12/20/2021 0411 by Macho Gómez RN  Outcome: Ongoing     Problem: Skin Integrity:  Goal: Demonstration of wound healing without infection will improve  Description: Demonstration of wound healing without infection will improve  12/20/2021 1806 by Caryle Kaplan, RN  Outcome:

## 2021-12-20 NOTE — OP NOTE
PODIATRY OP NOTE    PATIENT NAME: Sol Loyd DATE: 1985  -  39 y.o. male  MRN: [id number]  DATE: 12/20/2021  BILLING #: [id number]    Surgeon(s):  Shayy Ventura DPM     ASSISTANTS: Rowan Saravia DPM    PRE-OP DIAGNOSIS:   1. Abscess, left leg  2. Soft tissue mass, left leg  3. Bone lesion, first metatarsal, left foot  4. Bone lesion, proximal phalanx of hallux, left foot    POST-OP DIAGNOSIS: Same as above. PROCEDURE:   1. Incision and drainage, single incision, below the fascial plains  2. Bone biopsy x1, first metatarsal, left foot  3. Bone biopsy x1, proximal phalanx of hallux, left foot    ANESTHESIA: General with local    HEMOSTASIS: Pneumatic thigh tourniquet @300 mmHg for 47 minutes. ESTIMATED BLOOD LOSS: Less than 10 cc.     MATERIALS: 3-0 Monocryl, 3-0 nylon  * No implants in log *    INJECTABLES: 10 cc of one-to-one mix of 0.5% Marcaine plain 1% lidocaine plain preoperatively and 20 cc of 0.5% Marcaine plain postoperatively    SPECIMEN:   ID Type Source Tests Collected by Time Destination   1 : SOFT TISSUE MASS LEFT MEDIAL LOWER LEG Swab Leg CULTURE, ANAEROBIC AND AEROBIC Angelica Norton, [facility] 12/20/2021 0825    A : FIRST METATARSAL Bone Transmetatarsal SURGICAL PATHOLOGY, CULTURE, TISSUE Angelica Norton, [facility] 12/20/2021 0813    B : PROXIMAL PHALANX HALLEX Bone Foot SURGICAL PATHOLOGY, CULTURE, TISSUE Angelica Norton, [facility] 12/20/2021 0844    C : SOFT TISSUE MASS LEFT MEDIAL LOWER LEG Tissue Leg SURGICAL PATHOLOGY, CULTURE, TISSUE Angelica Norton, [facility] 12/20/2021 2370    D : FIRST METATARSAL Bone Foot SURGICAL PATHOLOGY Angelica Norton, [facility] 12/20/2021 0826    E : PROXIMAL PHALANX HALLEX Bone Foot SURGICAL PATHOLOGY Shayy Ventura DPM 12/20/2021 6051        COMPLICATIONS: None    FINDINGS: Large necrotic soft tissue mass found below the level of superficial fascia of the medial left leg, superficial to the deep fascia and medial head of the gastrocnemius muscle. No purulence expressed. Soft bone noted when obtaining bone biopsy from first metatarsal head and proximal phalanx of left hallux. The patient was counseled at length about the risks of donn Covid-19 during their perioperative period and any recovery window from their procedure. The patient was made aware that donn Covid-19  may worsen their prognosis for recovering from their procedure  and lend to a higher morbidity and/or mortality risk. All material risks, benefits, and reasonable alternatives including postponing the procedure were discussed. The patient does wish to proceed with the procedure at this time. INDICATION FOR PROCEDURE: Patient is a 31-year-old male who has had a longstanding abscess/soft tissue mass to the medial left leg. States it has been present for approximately 1 month. Patient has been seen at multiple other hospitals prior to admission at [facility]. MRI of the tibia/fibula of the left leg indicates a large organized collection along the superficial aspect of the medial head of the gastrocnemius muscle. MRI of the left foot also indicates concerns for septic joint of the first MTPJ as well as intraosseous abscess present to the proximal phalanx and first metatarsal head. Needle aspiration of abscess of the leg was attempted at bedside but was unsuccessful. Patient is failed other conservative treatments and elects to undergo surgical intervention for incision and drainage of left leg with bone biopsies of the left foot. Risks and benefits were discussed. No guarantees were given or implied. Consent is signed and in the chart. PROCEDURE IN DETAIL: The patient was transported from pre-op to the operating room and placed on the operating table in the supine position with a safety strap across the lap. After adequate sedation by the Anesthesia, a local block of 10cc of a 1:1 mixture of 1% lidocaine plain and 0.5% marcaine plain was injected. The foot was then prepped and draped in the usual aseptic fashion. Attention was directed to the medial left leg, overlying the gastrocnemius muscle. Longitudinal incision was made utilizing a #15 blade. Dissection was deepened to the level of the deep fascia utilizing sharp and blunt dissection. Any bleeding vessels were ligated and or cauterized. Care was taken to avoid any critical neurovascular structures. Following incision through the superficial fascia, there was noted to be a large collection of necrotic soft tissue which did not extend through the level of the deep fascia. Mass was removed in its entirety and passed to the back table to be sent for surgical pathology. Aerobic and anaerobic culture swabs were also taken from the area. Area was irrigated with copious amounts of sterile saline and closed in layers utilizing combination of absorbable and nonabsorbable suture. Next, attention was directed to the first metatarsal head where an incision was made utilizing #15 blade to allow for entrance of the Jamshidi needle. 2 separate specimens were taken and sent to the back table, 1 for culture and one for surgical pathology. Next, attention was directed to the proximal phalanx of the left hallux where incision was made utilizing #15 blade to allow for entrance of the Jamshidi needle. 2 separate specimens were taken and sent to the back table, 1 for culture and one for surgical pathology. Incisions overlying first metatarsal head and proximal phalanx were then closed with 3-0 nylon. Dressings consisting of Adaptic, 4 x 4 gauze, ABD pad, Kerlix and Ace were applied. The patient tolerated the above procedure and anesthesia well without complications. The patient was transported from the operating room to the PACU with vital signs stable and vascular status intact to the left foot. Patient was then transferred back to the floor.        Electronically signed by Al Hoyt DPM on 12/20/2021 at 9:15 AM
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
## 2021-12-21 VITALS
WEIGHT: 175 LBS | HEIGHT: 68 IN | RESPIRATION RATE: 20 BRPM | HEART RATE: 84 BPM | DIASTOLIC BLOOD PRESSURE: 71 MMHG | OXYGEN SATURATION: 94 % | SYSTOLIC BLOOD PRESSURE: 107 MMHG | BODY MASS INDEX: 26.52 KG/M2 | TEMPERATURE: 97.6 F

## 2021-12-21 LAB
ABSOLUTE EOS #: 0.05 K/UL (ref 0–0.44)
ABSOLUTE IMMATURE GRANULOCYTE: 0.04 K/UL (ref 0–0.3)
ABSOLUTE LYMPH #: 2.13 K/UL (ref 1.1–3.7)
ABSOLUTE MONO #: 0.66 K/UL (ref 0.1–1.2)
ALBUMIN SERPL-MCNC: 3.3 G/DL (ref 3.5–5.2)
ANION GAP SERPL CALCULATED.3IONS-SCNC: 14 MMOL/L (ref 9–17)
BASOPHILS # BLD: 0 % (ref 0–2)
BASOPHILS ABSOLUTE: 0.04 K/UL (ref 0–0.2)
BUN BLDV-MCNC: 18 MG/DL (ref 6–20)
BUN/CREAT BLD: ABNORMAL (ref 9–20)
CALCIUM SERPL-MCNC: 8.7 MG/DL (ref 8.6–10.4)
CHLORIDE BLD-SCNC: 96 MMOL/L (ref 98–107)
CO2: 22 MMOL/L (ref 20–31)
CREAT SERPL-MCNC: 0.57 MG/DL (ref 0.7–1.2)
CULTURE: NORMAL
DIFFERENTIAL TYPE: ABNORMAL
DIRECT EXAM: NORMAL
EOSINOPHILS RELATIVE PERCENT: 1 % (ref 1–4)
FOLATE: 7.9 NG/ML
GFR AFRICAN AMERICAN: >60 ML/MIN
GFR NON-AFRICAN AMERICAN: >60 ML/MIN
GFR SERPL CREATININE-BSD FRML MDRD: ABNORMAL ML/MIN/{1.73_M2}
GFR SERPL CREATININE-BSD FRML MDRD: ABNORMAL ML/MIN/{1.73_M2}
GLUCOSE BLD-MCNC: 134 MG/DL (ref 70–99)
HCT VFR BLD CALC: 35.6 % (ref 40.7–50.3)
HEMOGLOBIN: 10.2 G/DL (ref 13–17)
IMMATURE GRANULOCYTES: 0 %
IRON SATURATION: 10 % (ref 20–55)
IRON: 38 UG/DL (ref 59–158)
LYMPHOCYTES # BLD: 23 % (ref 24–43)
Lab: NORMAL
MAGNESIUM: 2.1 MG/DL (ref 1.6–2.6)
MCH RBC QN AUTO: 20.4 PG (ref 25.2–33.5)
MCHC RBC AUTO-ENTMCNC: 28.7 G/DL (ref 28.4–34.8)
MCV RBC AUTO: 71.3 FL (ref 82.6–102.9)
MONOCYTES # BLD: 7 % (ref 3–12)
NRBC AUTOMATED: 0 PER 100 WBC
PDW BLD-RTO: 17 % (ref 11.8–14.4)
PHOSPHORUS: 4 MG/DL (ref 2.5–4.5)
PLATELET # BLD: 184 K/UL (ref 138–453)
PLATELET ESTIMATE: ABNORMAL
PMV BLD AUTO: 8.6 FL (ref 8.1–13.5)
POTASSIUM SERPL-SCNC: 4.2 MMOL/L (ref 3.7–5.3)
RBC # BLD: 4.99 M/UL (ref 4.21–5.77)
RBC # BLD: ABNORMAL 10*6/UL
SEG NEUTROPHILS: 69 % (ref 36–65)
SEGMENTED NEUTROPHILS ABSOLUTE COUNT: 6.56 K/UL (ref 1.5–8.1)
SODIUM BLD-SCNC: 132 MMOL/L (ref 135–144)
SPECIMEN DESCRIPTION: NORMAL
SURGICAL PATHOLOGY REPORT: NORMAL
TOTAL IRON BINDING CAPACITY: 379 UG/DL (ref 250–450)
UNSATURATED IRON BINDING CAPACITY: 341 UG/DL (ref 112–347)
VITAMIN B-12: 422 PG/ML (ref 232–1245)
WBC # BLD: 9.5 K/UL (ref 3.5–11.3)
WBC # BLD: ABNORMAL 10*3/UL

## 2021-12-21 PROCEDURE — 94761 N-INVAS EAR/PLS OXIMETRY MLT: CPT

## 2021-12-21 PROCEDURE — 97535 SELF CARE MNGMENT TRAINING: CPT

## 2021-12-21 PROCEDURE — 82746 ASSAY OF FOLIC ACID SERUM: CPT

## 2021-12-21 PROCEDURE — 97162 PT EVAL MOD COMPLEX 30 MIN: CPT

## 2021-12-21 PROCEDURE — 99239 HOSP IP/OBS DSCHRG MGMT >30: CPT | Performed by: INTERNAL MEDICINE

## 2021-12-21 PROCEDURE — 6360000002 HC RX W HCPCS: Performed by: STUDENT IN AN ORGANIZED HEALTH CARE EDUCATION/TRAINING PROGRAM

## 2021-12-21 PROCEDURE — 6370000000 HC RX 637 (ALT 250 FOR IP): Performed by: STUDENT IN AN ORGANIZED HEALTH CARE EDUCATION/TRAINING PROGRAM

## 2021-12-21 PROCEDURE — 85025 COMPLETE CBC W/AUTO DIFF WBC: CPT

## 2021-12-21 PROCEDURE — 6360000002 HC RX W HCPCS: Performed by: INTERNAL MEDICINE

## 2021-12-21 PROCEDURE — 83550 IRON BINDING TEST: CPT

## 2021-12-21 PROCEDURE — 83540 ASSAY OF IRON: CPT

## 2021-12-21 PROCEDURE — 80069 RENAL FUNCTION PANEL: CPT

## 2021-12-21 PROCEDURE — 36415 COLL VENOUS BLD VENIPUNCTURE: CPT

## 2021-12-21 PROCEDURE — 83735 ASSAY OF MAGNESIUM: CPT

## 2021-12-21 PROCEDURE — 99232 SBSQ HOSP IP/OBS MODERATE 35: CPT | Performed by: INTERNAL MEDICINE

## 2021-12-21 PROCEDURE — 97166 OT EVAL MOD COMPLEX 45 MIN: CPT

## 2021-12-21 PROCEDURE — 97116 GAIT TRAINING THERAPY: CPT

## 2021-12-21 PROCEDURE — 2580000003 HC RX 258: Performed by: INTERNAL MEDICINE

## 2021-12-21 PROCEDURE — 82607 VITAMIN B-12: CPT

## 2021-12-21 RX ORDER — LINEZOLID 600 MG/1
600 TABLET, FILM COATED ORAL EVERY 12 HOURS SCHEDULED
Status: DISCONTINUED | OUTPATIENT
Start: 2021-12-21 | End: 2021-12-21

## 2021-12-21 RX ORDER — ONDANSETRON 4 MG/1
4 TABLET, ORALLY DISINTEGRATING ORAL EVERY 8 HOURS PRN
Qty: 21 TABLET | Refills: 0 | Status: ON HOLD | OUTPATIENT
Start: 2021-12-21 | End: 2022-04-15

## 2021-12-21 RX ORDER — ACETAMINOPHEN 325 MG/1
650 TABLET ORAL
Status: CANCELLED | OUTPATIENT
Start: 2021-12-22

## 2021-12-21 RX ORDER — SODIUM CHLORIDE 9 MG/ML
25 INJECTION, SOLUTION INTRAVENOUS PRN
Status: CANCELLED | OUTPATIENT
Start: 2021-12-22

## 2021-12-21 RX ORDER — ONDANSETRON 2 MG/ML
8 INJECTION INTRAMUSCULAR; INTRAVENOUS
Status: CANCELLED | OUTPATIENT
Start: 2021-12-22

## 2021-12-21 RX ORDER — SODIUM CHLORIDE 9 MG/ML
INJECTION, SOLUTION INTRAVENOUS CONTINUOUS
Status: CANCELLED | OUTPATIENT
Start: 2021-12-22

## 2021-12-21 RX ORDER — SODIUM CHLORIDE 0.9 % (FLUSH) 0.9 %
5-40 SYRINGE (ML) INJECTION PRN
Status: CANCELLED | OUTPATIENT
Start: 2021-12-22

## 2021-12-21 RX ORDER — DIPHENHYDRAMINE HYDROCHLORIDE 50 MG/ML
50 INJECTION INTRAMUSCULAR; INTRAVENOUS
Status: CANCELLED | OUTPATIENT
Start: 2021-12-22

## 2021-12-21 RX ORDER — EPINEPHRINE 1 MG/ML
0.3 INJECTION, SOLUTION, CONCENTRATE INTRAVENOUS PRN
Status: CANCELLED | OUTPATIENT
Start: 2021-12-22

## 2021-12-21 RX ORDER — HEPARIN SODIUM (PORCINE) LOCK FLUSH IV SOLN 100 UNIT/ML 100 UNIT/ML
500 SOLUTION INTRAVENOUS PRN
Status: CANCELLED | OUTPATIENT
Start: 2021-12-22

## 2021-12-21 RX ORDER — POLYETHYLENE GLYCOL 3350 17 G/17G
17 POWDER, FOR SOLUTION ORAL DAILY PRN
Qty: 116 G | Refills: 1 | Status: SHIPPED | OUTPATIENT
Start: 2021-12-21 | End: 2021-12-28

## 2021-12-21 RX ORDER — ALBUTEROL SULFATE 90 UG/1
4 AEROSOL, METERED RESPIRATORY (INHALATION) PRN
Status: CANCELLED | OUTPATIENT
Start: 2021-12-22

## 2021-12-21 RX ADMIN — BACLOFEN 20 MG: 10 TABLET ORAL at 08:36

## 2021-12-21 RX ADMIN — GABAPENTIN 800 MG: 800 TABLET ORAL at 08:36

## 2021-12-21 RX ADMIN — BACLOFEN 20 MG: 10 TABLET ORAL at 14:06

## 2021-12-21 RX ADMIN — MORPHINE SULFATE 4 MG: 4 INJECTION INTRAVENOUS at 02:14

## 2021-12-21 RX ADMIN — VANCOMYCIN HYDROCHLORIDE 1000 MG: 1 INJECTION, SOLUTION INTRAVENOUS at 08:36

## 2021-12-21 RX ADMIN — FAMOTIDINE 20 MG: 20 TABLET, FILM COATED ORAL at 08:36

## 2021-12-21 RX ADMIN — MORPHINE SULFATE 4 MG: 4 INJECTION INTRAVENOUS at 05:16

## 2021-12-21 RX ADMIN — DAPTOMYCIN 400 MG: 500 INJECTION, POWDER, LYOPHILIZED, FOR SOLUTION INTRAVENOUS at 14:36

## 2021-12-21 RX ADMIN — METHADONE HYDROCHLORIDE 100 MG: 5 SOLUTION ORAL at 08:36

## 2021-12-21 RX ADMIN — APIXABAN 5 MG: 5 TABLET, FILM COATED ORAL at 08:36

## 2021-12-21 RX ADMIN — GABAPENTIN 800 MG: 800 TABLET ORAL at 14:06

## 2021-12-21 ASSESSMENT — PAIN DESCRIPTION - ORIENTATION
ORIENTATION: LEFT
ORIENTATION: LEFT

## 2021-12-21 ASSESSMENT — PAIN DESCRIPTION - PAIN TYPE
TYPE: SURGICAL PAIN;CHRONIC PAIN
TYPE: ACUTE PAIN;SURGICAL PAIN

## 2021-12-21 ASSESSMENT — PAIN SCALES - GENERAL
PAINLEVEL_OUTOF10: 6
PAINLEVEL_OUTOF10: 8
PAINLEVEL_OUTOF10: 10

## 2021-12-21 ASSESSMENT — PAIN DESCRIPTION - LOCATION
LOCATION: LEG
LOCATION: LEG

## 2021-12-21 ASSESSMENT — PAIN - FUNCTIONAL ASSESSMENT
PAIN_FUNCTIONAL_ASSESSMENT: PREVENTS OR INTERFERES SOME ACTIVE ACTIVITIES AND ADLS
PAIN_FUNCTIONAL_ASSESSMENT: PREVENTS OR INTERFERES SOME ACTIVE ACTIVITIES AND ADLS

## 2021-12-21 ASSESSMENT — PAIN DESCRIPTION - DESCRIPTORS
DESCRIPTORS: ACHING;DISCOMFORT
DESCRIPTORS: DISCOMFORT;ACHING
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
## 2021-12-21 NOTE — PLAN OF CARE
Problem: Falls - Risk of:  Goal: Will remain free from falls  Description: Will remain free from falls  12/21/2021 0307 by Perla Chung RN  Outcome: Ongoing  12/20/2021 1806 by Elisa Calzada RN  Outcome: Ongoing  Goal: Absence of physical injury  Description: Absence of physical injury  12/21/2021 0307 by Perla Chung RN  Outcome: Ongoing  12/20/2021 1806 by Elisa Calzada RN  Outcome: Ongoing     Problem: Nutritional:  Goal: Nutritional status will improve  Description: Nutritional status will improve  12/21/2021 0307 by Perla Chung RN  Outcome: Ongoing  12/20/2021 1806 by Elisa Calzada RN  Outcome: Ongoing     Problem: Physical Regulation:  Goal: Diagnostic test results will improve  Description: Diagnostic test results will improve  12/21/2021 0307 by Perla Chung RN  Outcome: Ongoing  12/20/2021 1806 by Elisa Calzada RN  Outcome: Ongoing  Goal: Will remain free from infection  Description: Will remain free from infection  12/21/2021 0307 by Perla Chung RN  Outcome: Ongoing  12/20/2021 1806 by Elisa Calzada RN  Outcome: Ongoing  Goal: Ability to maintain vital signs within normal range will improve  Description: Ability to maintain vital signs within normal range will improve  12/21/2021 0307 by Perla Chung RN  Outcome: Ongoing  12/20/2021 1806 by Elisa Calzada RN  Outcome: Ongoing     Problem: Respiratory:  Goal: Ability to maintain normal respiratory secretions will improve  Description: Ability to maintain normal respiratory secretions will improve  12/21/2021 0307 by Perla Chung RN  Outcome: Ongoing  12/20/2021 1806 by Elisa Calzada RN  Outcome: Ongoing     Problem: Skin Integrity:  Goal: Demonstration of wound healing without infection will improve  Description: Demonstration of wound healing without infection will improve  12/21/2021 0307 by Perla Chung RN  Outcome: Ongoing  12/20/2021
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
## 2021-12-21 NOTE — PROGRESS NOTES
Patient called out stating that his \"lungs hurt\". Offered patient and IS and patient refused right away.
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
## 2021-12-21 NOTE — PROGRESS NOTES
Progress Note  Podiatric Medicine and Surgery     Subjective     CC: Left lower extremity pain    S/P I&D left leg, bone bx left 1st met and hallux (DOS: 12/20/21)    Patient seen and examined   No acute events overnight. Afebrile, vital signs stable     HPI :  Paddy Myles is a 39 y.o. male seen at [facility]. [facility] for pain to the left foot and left leg. The patient states that he has a history of cardiac surgery this year. Patient states that recently when he awoke one morning his left calf was swollen, painful, and felt like a mark horse--he was unable to ambulate well. The patient went to multiple EDs for evaluation but left all AMA--he reports \"an abscess of the left calf and septic emboli\" were found on imaging, however, the patient did not receive treatment. He does admit to using IV cocaine and the last instance was 2 weeks ago. He denies any open wounds.  are at bedside, patient is incarcerated. ROS: Denies N/V/F/C/SOB/CP. Otherwise negative except at stated in the HPI.      Medications:  Scheduled Meds:   apixaban  5 mg Oral BID    lidocaine PF  20 mL IntraDERmal Once    vancomycin  1,000 mg IntraVENous Q8H    famotidine  20 mg Oral BID    ketorolac  30 mg IntraVENous Once    methadone  100 mg Oral Daily    venlafaxine  75 mg Oral Daily with breakfast    vancomycin (VANCOCIN) intermittent dosing (placeholder)   Other RX Placeholder    sodium chloride flush  5-40 mL IntraVENous 2 times per day    gabapentin  800 mg Oral TID    baclofen  20 mg Oral TID       Continuous Infusions:   sodium chloride 25 mL (12/19/21 0441)       PRN Meds:morphine **OR** morphine, sodium chloride flush, sodium chloride flush, sodium chloride flush, sodium chloride, potassium chloride **OR** potassium alternative oral replacement **OR** potassium chloride, magnesium sulfate, ondansetron **OR** ondansetron, polyethylene glycol, acetaminophen **OR** acetaminophen    Objective     Vitals:  Patient Vitals for the past 8 hrs:   BP Temp Temp src Pulse Resp SpO2   21 0739 (!) 100/58 97.7 °F (36.5 °C) Oral 83 18 95 %   21 0521    78  100 %   21 0432    80  96 %   21 0230    82  100 %     Average, Min, and Max for last 24 hours Vitals:  TEMPERATURE:  Temp  Av.7 °F (36.5 °C)  Min: 97.5 °F (36.4 °C)  Max: 98 °F (36.7 °C)    RESPIRATIONS RANGE: Resp  Avg: 15.6  Min: 13  Max: 18    PULSE RANGE: Pulse  Av.7  Min: 77  Max: 84    BLOOD PRESSURE RANGE:  Systolic (97DXB), FTS:523 , Min:100 , QQL:267   ; Diastolic (01QEH), KHF:88, Min:58, Max:77      PULSE OXIMETRY RANGE: SpO2  Av.8 %  Min: 95 %  Max: 100 %    I/O last 3 completed shifts: In: 910 [P.O.:240; IV Piggyback:670]  Out: 2925 [Urine:2400; Blood:25]    CBC:  Recent Labs     21  0526 21  0423 21  0514   WBC 5.2 5.2 9.5   HGB 10.3* 10.2* 10.2*   HCT 37.3* 35.0* 35.6*    190 184        BMP:  Recent Labs     21  0526 21  0423 21  0514    138 132*   K 4.3 4.4 4.2    100 96*   CO2 26 26 22   BUN 17 18 18   CREATININE 0.55* 0.66* 0.57*   GLUCOSE 93 90 134*   CALCIUM 8.3* 8.5* 8.7        Coags:  No results for input(s): APTT, PROT, INR in the last 72 hours. Lab Results   Component Value Date    SEDRATE 31 (H) 2021     No results for input(s): CRP in the last 72 hours. Lower Extremity Physical Exam:    Vascular: DP and PT pulses are Palpable . CFT <3 seconds to all digits. Hair growth is present to the level of the digits. Moderate non-pitting edema to the left lower extremity.       Neuro: Saph/sural/SP/DP/plantar sensation intact to light touch.     Musculoskeletal: Muscle strength is 5/5 to all lower extremity muscle groups on the right, deferred on the left because of increased pain to calf and foot. Gross deformity is absent. TTP of calf and 1st MPJ, left. Minimal pain with left 1st MPJ ROM. Compartments of LLE taut but compressible.    Dermatologic: left leg and foot wound sites well co-apted without drainage, malodor, erythema. Sutures all in place. Imaging:   XR CHEST PORTABLE   Final Result   Stable chest when compared to the prior exam         MRI TIBIA FIBULA LEFT W WO CONTRAST   Final Result   1. Large organized collection along the superficial aspect of the medial head   of the gastrocnemius. The collection measures 1.8 x 5.8 x 18.7 cm and does   demonstrate peripheral enhancement. While sterility of fluid is   indeterminate on imaging, abscess cannot be excluded. 2. No osteomyelitis identified. VL DUP LOWER EXTREMITY VENOUS BILATERAL   Final Result      US EXTREMITY LEFT NON VASC LIMITED   Final Result   No convincing sonographic findings typical of abscess. Calf muscle is mildly heterogeneous and hypoechoic suggesting possible   inflammation/edema, injury, or myositis versus complex collection adjacent to   the muscle. MRI is recommended for further evaluation. MRI FOOT LEFT W WO CONTRAST   Final Result   1. Soft tissue edema and enhancement about the great toe most suggestive of   cellulitis. Underlying moderate 1st MTP joint effusion and osteomyelitis of   the proximal phalanx and 1st metatarsal with findings concerning for septic   joint. Intraosseous abscess present the proximal phalanx and 1st metatarsal   head. Edema and enhancement extend to the 1st metatarsal base. RECOMMENDATIONS:   Unavailable         XR CHEST PORTABLE   Final Result   Patchy bilateral nodular opacities in the lungs more in the left base. Consider follow-up CT         XR TIBIA FIBULA LEFT (2 VIEWS)   Final Result   No acute osseous abnormality. XR ANKLE LEFT (MIN 3 VIEWS)   Final Result   Left ankle: No acute bony abnormality. Left foot: Demineralization on both sides of the 1st metatarsal-phalangeal   and D IP joints the great toe with soft tissue swelling.   No cortical   destruction but findings may be compatible with osteomyelitis less likely   erosive osteoarthritis. RECOMMENDATION:   Radionuclide bone imaging versus MRI study of the foot advised. XR FOOT LEFT (MIN 3 VIEWS)   Final Result   Left ankle: No acute bony abnormality. Left foot: Demineralization on both sides of the 1st metatarsal-phalangeal   and D IP joints the great toe with soft tissue swelling. No cortical   destruction but findings may be compatible with osteomyelitis less likely   erosive osteoarthritis. RECOMMENDATION:   Radionuclide bone imaging versus MRI study of the foot advised. Cultures: NGTD    Assessment   Fadi Lyman is a 39 y.o. male with   1. S/P left leg mass excision, bone biopsy left foot (DOS: 12/20/21)  2. Septic joint 1st MPJ, left   3. Abscess, left leg  4. Acute left calf pain   5. IV drug use    Principal Problem:    Osteomyelitis ([address]  Active Problems:    Chronic right-sided low back pain with right-sided sciatica    Drug abuse ([address]    Chronic hepatitis C without hepatic coma (HCC)    Cocaine abuse ([address]    Methadone dependence ([address]    Pulmonary embolism (HCC)    Microcytic anemia  Resolved Problems:    * No resolved hospital problems. *       Plan     · Patient seen and evaluated at bedside. · Treatment options discussed in detail with the patient. · Radiographs reviewed  ? Negative for soft tissue emphysema, acute fracture/dislocation, foreign body, or osteomyelitis. There is demineralization of the proximal phalanx and 1st met head. · MRI of the left foot reviewed: suspicious for 1st MPJ septic joint, osteomyelitis of 1st met head and proximal phalanx and intraosseous abscess. · Internal medicine following as primary team  · Infectious disease following-recommend continuing vancomycin   · Path and micro results are pending and can be followed out patient  · Heel WB to left lower extremity. · Pt is ok to discharge to detention from a podiatry standpoint.   Dressing changes verbalized to nurse pt will be coming in for IV infusions every day for the next two weeks minimum. Pt to follow up in [facility] clinic in 10 days for suture removal and for post-op visit. · Discussed with Dr. Adam Bañuelos.       Wandy Steinberg DPM   Podiatric Medicine & Surgery   12/21/2021 at 9:21 AM

## 2021-12-21 NOTE — DISCHARGE SUMMARY
[facility]  Office: [address], DO, Josh Abbott, DO, Lyubov Moon, DO, Janna Portillo Blood, DO, Lacey Mcknight MD, Mike Maciel MD, Karla Joe MD, Car Li MD, Kian El MD, Jose Polk MD, Rene Benavidez MD, Zahira Sigala, DO, Diamond Ellis, DO, Daisy Avendano MD,  Alexandria Lloyd, DO, Chata Banegas MD, Carlos Borrego MD, Marissa Chavez MD, Angelo Dave MD, Dinah Calhoun MD, Meghan Mendoza MD, Candice Guerrier MD, Monserrat Rodriguez [facility], [facility], CNP, Mary Walker, CNP, [facility] , CNS, [facility], CNP, Kelly Camargo, CNP, Yasmin Chino, CNP, Cali Loza, CNP, Santiago Hernandez, CNP, Kathie Tello PA-C, Susanna Chapa, DNP, Salazar Woodard, [facility], Willem Hendrix, CNP, Regan Hernandez, CNP, Selene Clark, CNP, Yoandy Baird, CNP, Cathy Angulo, CNP, Stacie Lewis, [address] [place] [address]    Discharge Summary     Patient ID: Luz Coughlin  :  1985   MRN: [id number]     ACCOUNT:  [de-identified]   Patient's PCP: No primary care provider on file. Admit Date: 2021   Discharge Date: 2021  Length of Stay: 5  Code Status:  Full Code  Admitting Physician: Rene Benavidez MD  Discharge Physician: Ana Soto MD     Active Discharge Diagnoses:     Hospital Problem Lists:  Principal Problem:    Osteomyelitis [facility])  Active Problems:    Chronic right-sided low back pain with right-sided sciatica    Drug abuse ([facility]    Chronic hepatitis C without hepatic coma (HCC)    Cocaine abuse ([facility]    Methadone dependence ([facility]    Pulmonary embolism ([facility]    Microcytic anemia  Resolved Problems:    * No resolved hospital problems. *        Discharged Condition: stable    Hospital Stay:     Hospital Course: This is 39years old gentleman who came into the hospital because of left foot pain and swelling. It was concerning for osteomyelitis. He also has a history of pulmonary embolism and is on Eliquis. Patient underwent MRI and evaluation by ID and podiatry. MRI was also concerning for osteomyelitis. On 12/20/2021, patient underwent incision and drainage below the fascial planes and bone biopsy. Postoperatively patient was stable. His pain was controlled. Infectious disease recommended discharging on daptomycin for 2 weeks. .  Podiatry to recommend dressing changes. Patient to follow-up with ID and podiatry outpatient. Review of systems: All systems were reviewed and found to be negative except for those mentioned elsewhere in the note. 1. Significant therapeutic interventions: Incision and drainage, single incision, below the fascial plains  2. Bone biopsy x1, first metatarsal, left foot  3. Bone biopsy x1, proximal phalanx of hallux, left foot    Physical examination    Respiratory exam: Bilateral air entry no rhonchi or wheezes  Cardiovascular examination: S1, S2  Abdominal examination: Soft, nontender, bowel sounds present  Extremities: nontender, no edema, dressing applied per podiatry      Significant Diagnostic Studies:   Labs / Micro:  CBC:   Lab Results   Component Value Date    WBC 9.5 12/21/2021    RBC 4.99 12/21/2021    HGB 10.2 12/21/2021    HCT 35.6 12/21/2021    MCV 71.3 12/21/2021    MCH 20.4 12/21/2021    MCHC 28.7 12/21/2021    RDW 17.0 12/21/2021     12/21/2021     BMP:    Lab Results   Component Value Date    GLUCOSE 134 12/21/2021     12/21/2021    K 4.2 12/21/2021    CL 96 12/21/2021    CO2 22 12/21/2021    ANIONGAP 14 12/21/2021    BUN 18 12/21/2021    CREATININE 0.57 12/21/2021    BUNCRER NOT REPORTED 12/21/2021    CALCIUM 8.7 12/21/2021    LABGLOM >60 12/21/2021    GFRAA >60 12/21/2021    GFR      12/21/2021    GFR NOT REPORTED 12/21/2021       Radiology:  XR TIBIA FIBULA LEFT (2 VIEWS)    Result Date: 12/16/2021  No acute osseous abnormality. XR ANKLE LEFT (MIN 3 VIEWS)    Result Date: 12/16/2021  Left ankle: No acute bony abnormality.  Left foot: Demineralization on both sides of the 1st metatarsal-phalangeal and D IP joints the great toe with soft tissue swelling. No cortical destruction but findings may be compatible with osteomyelitis less likely erosive osteoarthritis. RECOMMENDATION: Radionuclide bone imaging versus MRI study of the foot advised. XR FOOT LEFT (MIN 3 VIEWS)    Result Date: 12/16/2021  Left ankle: No acute bony abnormality. Left foot: Demineralization on both sides of the 1st metatarsal-phalangeal and D IP joints the great toe with soft tissue swelling. No cortical destruction but findings may be compatible with osteomyelitis less likely erosive osteoarthritis. RECOMMENDATION: Radionuclide bone imaging versus MRI study of the foot advised. XR CHEST PORTABLE    Result Date: 12/19/2021  Stable chest when compared to the prior exam     XR CHEST PORTABLE    Result Date: 12/16/2021  Patchy bilateral nodular opacities in the lungs more in the left base. Consider follow-up CT     MRI FOOT LEFT W WO CONTRAST    Result Date: 12/17/2021  1. Soft tissue edema and enhancement about the great toe most suggestive of cellulitis. Underlying moderate 1st MTP joint effusion and osteomyelitis of the proximal phalanx and 1st metatarsal with findings concerning for septic joint. Intraosseous abscess present the proximal phalanx and 1st metatarsal head. Edema and enhancement extend to the 1st metatarsal base. RECOMMENDATIONS: Unavailable     MRI TIBIA FIBULA LEFT W WO CONTRAST    Result Date: 12/18/2021  1. Large organized collection along the superficial aspect of the medial head of the gastrocnemius. The collection measures 1.8 x 5.8 x 18.7 cm and does demonstrate peripheral enhancement. While sterility of fluid is indeterminate on imaging, abscess cannot be excluded. 2. No osteomyelitis identified. US EXTREMITY LEFT NON VASC LIMITED    Result Date: 12/19/2021  No convincing sonographic findings typical of abscess.  Calf muscle is mildly heterogeneous and hypoechoic suggesting possible inflammation/edema, injury, or myositis versus complex collection adjacent to the muscle. MRI is recommended for further evaluation. Consultations:    Consults:     Final Specialist Recommendations/Findings:   IP CONSULT TO PODIATRY  PHARMACY TO DOSE VANCOMYCIN  IP CONSULT TO INFECTIOUS DISEASES      The patient was seen and examined on day of discharge and this discharge summary is in conjunction with any daily progress note from day of discharge. Discharge plan:     Disposition: Correctional facility    Physician Follow Up:     Pattie Castrejon MD  [facility] [address]  [phone]    In 2 weeks      Jyotsna Hart, [address] [place] [address] 20 [address] [facility] 3  [phone]    In 10 days         Requiring Further Evaluation/Follow Up POST HOSPITALIZATION/Incidental Findings: CPK and CBC monitoring by correctional facility physician, infectious disease and PCP    Diet: regular diet    Activity: As per podiatry      Discharge Medications:      Medication List      START taking these medications    DAPTOmycin  infusion  Commonly known as: CUBICIN  Infuse 400 mg intravenously every 24 hours for 14 days Compound per protocol. Start taking on: December 22, 2021     polyethylene glycol 17 GM/SCOOP powder  Commonly known as: GLYCOLAX  Take 17 g by mouth daily as needed (Constipation)        CHANGE how you take these medications    gabapentin 800 MG tablet  Commonly known as: NEURONTIN  TAKE ONE TABLET BY MOUTH THREE TIMES A DAY  What changed: See the new instructions.         CONTINUE taking these medications    baclofen 20 MG tablet  Commonly known as: LIORESAL     [employer] Lumbar/Back Support Brace Misc  1 Units by Does not apply route daily     Eliquis 5 MG Tabs tablet  Generic drug: apixaban     ondansetron 4 MG disintegrating tablet  Commonly known as: Zofran ODT  Take 1 tablet by mouth every 8 hours as needed for Nausea or Vomiting     venlafaxine 75 MG tablet  Commonly known as: EFFEXOR  Take 2 tablets by mouth 2 times daily        STOP taking these medications    amitriptyline 100 MG tablet  Commonly known as: ELAVIL     bumetanide 1 MG tablet  Commonly known as: BUMEX     carBAMazepine 200 MG tablet  Commonly known as: Epitol     LASIX PO     methadone 5 MG/5ML solution           Where to Get Your Medications      These medications were sent to [facility] [address] [facility], [address] [place] [address]  [address], [address]  [address]    Phone: [phone]   · ondansetron 4 MG disintegrating tablet  · polyethylene glycol 17 GM/SCOOP powder     You can get these medications from any pharmacy    Bring a paper prescription for each of these medications  · DAPTOmycin  infusion         Discharge Procedure Orders   CK   Standing Status: Standing Number of Occurrences: 3 Standing Exp. Date: 01/21/22   Order Comments: Please send results to correctional facility physician and Dr Douglas Lloyd   Scheduling Instructions: Please send results to correctional facility physician and Dr Douglas Lloyd       Time Spent on discharge is  32 mins in patient examination, evaluation, counseling as well as medication reconciliation, prescriptions for required medications, discharge plan and follow up. Electronically signed by   Debi Byrd MD  12/21/2021  4:27 PM      Thank you Dr. Ailyn Fregoso primary care provider on file. for the opportunity to be involved in this patient's care.

## 2021-12-22 ENCOUNTER — HOSPITAL ENCOUNTER (OUTPATIENT)
Dept: ONCOLOGY | Age: 36
Discharge: HOME | End: 2021-12-22
Attending: INTERNAL MEDICINE | Admitting: INTERNAL MEDICINE
Payer: COMMERCIAL

## 2021-12-22 VITALS
TEMPERATURE: 97.7 F | OXYGEN SATURATION: 98 % | RESPIRATION RATE: 18 BRPM | HEART RATE: 96 BPM | SYSTOLIC BLOOD PRESSURE: 119 MMHG | DIASTOLIC BLOOD PRESSURE: 80 MMHG

## 2021-12-22 DIAGNOSIS — M86.9 OSTEOMYELITIS OF LEFT FOOT, UNSPECIFIED TYPE (HCC): Primary | ICD-10-CM

## 2021-12-22 PROCEDURE — 76937 US GUIDE VASCULAR ACCESS: CPT

## 2021-12-22 PROCEDURE — 2580000003 HC RX 258: Performed by: INTERNAL MEDICINE

## 2021-12-22 PROCEDURE — 6360000002 HC RX W HCPCS: Performed by: INTERNAL MEDICINE

## 2021-12-22 PROCEDURE — 96365 THER/PROPH/DIAG IV INF INIT: CPT

## 2021-12-22 RX ORDER — SODIUM CHLORIDE 0.9 % (FLUSH) 0.9 %
5-40 SYRINGE (ML) INJECTION PRN
Status: CANCELLED | OUTPATIENT
Start: 2021-12-23

## 2021-12-22 RX ORDER — SODIUM CHLORIDE 9 MG/ML
25 INJECTION, SOLUTION INTRAVENOUS PRN
Status: CANCELLED | OUTPATIENT
Start: 2021-12-23

## 2021-12-22 RX ORDER — EPINEPHRINE 1 MG/ML
0.3 INJECTION, SOLUTION, CONCENTRATE INTRAVENOUS PRN
Status: CANCELLED | OUTPATIENT
Start: 2021-12-23

## 2021-12-22 RX ORDER — ONDANSETRON 2 MG/ML
8 INJECTION INTRAMUSCULAR; INTRAVENOUS
Status: CANCELLED | OUTPATIENT
Start: 2021-12-23

## 2021-12-22 RX ORDER — SODIUM CHLORIDE 9 MG/ML
INJECTION, SOLUTION INTRAVENOUS CONTINUOUS
Status: CANCELLED | OUTPATIENT
Start: 2021-12-23

## 2021-12-22 RX ORDER — ACETAMINOPHEN 325 MG/1
650 TABLET ORAL
Status: CANCELLED | OUTPATIENT
Start: 2021-12-23

## 2021-12-22 RX ORDER — DIPHENHYDRAMINE HYDROCHLORIDE 50 MG/ML
50 INJECTION INTRAMUSCULAR; INTRAVENOUS
Status: CANCELLED | OUTPATIENT
Start: 2021-12-23

## 2021-12-22 RX ORDER — ALBUTEROL SULFATE 90 UG/1
4 AEROSOL, METERED RESPIRATORY (INHALATION) PRN
Status: CANCELLED | OUTPATIENT
Start: 2021-12-23

## 2021-12-22 RX ORDER — HEPARIN SODIUM (PORCINE) LOCK FLUSH IV SOLN 100 UNIT/ML 100 UNIT/ML
500 SOLUTION INTRAVENOUS PRN
Status: CANCELLED | OUTPATIENT
Start: 2021-12-23

## 2021-12-22 RX ADMIN — DAPTOMYCIN 400 MG: 500 INJECTION, POWDER, LYOPHILIZED, FOR SOLUTION INTRAVENOUS at 16:09

## 2021-12-23 ENCOUNTER — HOSPITAL ENCOUNTER (OUTPATIENT)
Dept: ONCOLOGY | Age: 36
Discharge: HOME | End: 2021-12-23
Attending: INTERNAL MEDICINE | Admitting: INTERNAL MEDICINE
Payer: COMMERCIAL

## 2021-12-23 VITALS
RESPIRATION RATE: 19 BRPM | OXYGEN SATURATION: 96 % | TEMPERATURE: 98.5 F | HEART RATE: 68 BPM | SYSTOLIC BLOOD PRESSURE: 138 MMHG | DIASTOLIC BLOOD PRESSURE: 85 MMHG

## 2021-12-23 DIAGNOSIS — M86.9 OSTEOMYELITIS OF LEFT FOOT, UNSPECIFIED TYPE (HCC): Primary | ICD-10-CM

## 2021-12-23 PROCEDURE — 6360000002 HC RX W HCPCS: Performed by: INTERNAL MEDICINE

## 2021-12-23 PROCEDURE — 96365 THER/PROPH/DIAG IV INF INIT: CPT

## 2021-12-23 PROCEDURE — 2580000003 HC RX 258: Performed by: INTERNAL MEDICINE

## 2021-12-23 RX ORDER — ACETAMINOPHEN 325 MG/1
650 TABLET ORAL
Status: CANCELLED | OUTPATIENT
Start: 2021-12-24

## 2021-12-23 RX ORDER — SODIUM CHLORIDE 9 MG/ML
25 INJECTION, SOLUTION INTRAVENOUS PRN
Status: CANCELLED | OUTPATIENT
Start: 2021-12-24

## 2021-12-23 RX ORDER — SODIUM CHLORIDE 0.9 % (FLUSH) 0.9 %
5-40 SYRINGE (ML) INJECTION PRN
Status: CANCELLED | OUTPATIENT
Start: 2021-12-24

## 2021-12-23 RX ORDER — HEPARIN SODIUM (PORCINE) LOCK FLUSH IV SOLN 100 UNIT/ML 100 UNIT/ML
500 SOLUTION INTRAVENOUS PRN
Status: CANCELLED | OUTPATIENT
Start: 2021-12-24

## 2021-12-23 RX ORDER — ONDANSETRON 2 MG/ML
8 INJECTION INTRAMUSCULAR; INTRAVENOUS
Status: CANCELLED | OUTPATIENT
Start: 2021-12-24

## 2021-12-23 RX ORDER — EPINEPHRINE 1 MG/ML
0.3 INJECTION, SOLUTION, CONCENTRATE INTRAVENOUS PRN
Status: CANCELLED | OUTPATIENT
Start: 2021-12-24

## 2021-12-23 RX ORDER — ALBUTEROL SULFATE 90 UG/1
4 AEROSOL, METERED RESPIRATORY (INHALATION) PRN
Status: CANCELLED | OUTPATIENT
Start: 2021-12-24

## 2021-12-23 RX ORDER — SODIUM CHLORIDE 9 MG/ML
INJECTION, SOLUTION INTRAVENOUS CONTINUOUS
Status: CANCELLED | OUTPATIENT
Start: 2021-12-24

## 2021-12-23 RX ORDER — DIPHENHYDRAMINE HYDROCHLORIDE 50 MG/ML
50 INJECTION INTRAMUSCULAR; INTRAVENOUS
Status: CANCELLED | OUTPATIENT
Start: 2021-12-24

## 2021-12-23 RX ADMIN — DAPTOMYCIN 400 MG: 500 INJECTION, POWDER, LYOPHILIZED, FOR SOLUTION INTRAVENOUS at 15:26

## 2021-12-26 LAB
CULTURE: ABNORMAL
DIRECT EXAM: ABNORMAL
DIRECT EXAM: ABNORMAL
Lab: ABNORMAL
SPECIMEN DESCRIPTION: ABNORMAL

## 2021-12-27 ENCOUNTER — HOSPITAL ENCOUNTER (OUTPATIENT)
Dept: ONCOLOGY | Age: 36
Discharge: HOME | End: 2021-12-27
Attending: INTERNAL MEDICINE | Admitting: INTERNAL MEDICINE
Payer: COMMERCIAL

## 2021-12-27 VITALS
DIASTOLIC BLOOD PRESSURE: 96 MMHG | OXYGEN SATURATION: 100 % | SYSTOLIC BLOOD PRESSURE: 118 MMHG | RESPIRATION RATE: 18 BRPM | TEMPERATURE: 97.5 F | HEART RATE: 98 BPM

## 2021-12-27 DIAGNOSIS — M86.9 OSTEOMYELITIS OF LEFT FOOT, UNSPECIFIED TYPE (HCC): Primary | ICD-10-CM

## 2021-12-27 PROCEDURE — 96365 THER/PROPH/DIAG IV INF INIT: CPT

## 2021-12-27 PROCEDURE — 2580000003 HC RX 258: Performed by: INTERNAL MEDICINE

## 2021-12-27 PROCEDURE — 6360000002 HC RX W HCPCS: Performed by: INTERNAL MEDICINE

## 2021-12-27 RX ORDER — ONDANSETRON 2 MG/ML
8 INJECTION INTRAMUSCULAR; INTRAVENOUS
Status: CANCELLED | OUTPATIENT
Start: 2021-12-28

## 2021-12-27 RX ORDER — EPINEPHRINE 1 MG/ML
0.3 INJECTION, SOLUTION, CONCENTRATE INTRAVENOUS PRN
Status: CANCELLED | OUTPATIENT
Start: 2021-12-28

## 2021-12-27 RX ORDER — SODIUM CHLORIDE 0.9 % (FLUSH) 0.9 %
5-40 SYRINGE (ML) INJECTION PRN
Status: CANCELLED | OUTPATIENT
Start: 2021-12-28

## 2021-12-27 RX ORDER — HEPARIN SODIUM (PORCINE) LOCK FLUSH IV SOLN 100 UNIT/ML 100 UNIT/ML
500 SOLUTION INTRAVENOUS PRN
Status: CANCELLED | OUTPATIENT
Start: 2021-12-28

## 2021-12-27 RX ORDER — DIPHENHYDRAMINE HYDROCHLORIDE 50 MG/ML
50 INJECTION INTRAMUSCULAR; INTRAVENOUS
Status: CANCELLED | OUTPATIENT
Start: 2021-12-28

## 2021-12-27 RX ORDER — ALBUTEROL SULFATE 90 UG/1
4 AEROSOL, METERED RESPIRATORY (INHALATION) PRN
Status: CANCELLED | OUTPATIENT
Start: 2021-12-28

## 2021-12-27 RX ORDER — SODIUM CHLORIDE 9 MG/ML
25 INJECTION, SOLUTION INTRAVENOUS PRN
Status: CANCELLED | OUTPATIENT
Start: 2021-12-28

## 2021-12-27 RX ORDER — ACETAMINOPHEN 325 MG/1
650 TABLET ORAL
Status: CANCELLED | OUTPATIENT
Start: 2021-12-28

## 2021-12-27 RX ORDER — SODIUM CHLORIDE 9 MG/ML
INJECTION, SOLUTION INTRAVENOUS CONTINUOUS
Status: CANCELLED | OUTPATIENT
Start: 2021-12-28

## 2021-12-27 RX ADMIN — DAPTOMYCIN 400 MG: 500 INJECTION, POWDER, LYOPHILIZED, FOR SOLUTION INTRAVENOUS at 16:16

## 2021-12-30 ENCOUNTER — APPOINTMENT (OUTPATIENT)
Dept: CT IMAGING | Age: 36
End: 2021-12-30
Payer: COMMERCIAL

## 2021-12-30 ENCOUNTER — HOSPITAL ENCOUNTER (EMERGENCY)
Age: 36
Discharge: OTHER INSTITUTION | End: 2021-12-30
Attending: EMERGENCY MEDICINE
Payer: COMMERCIAL

## 2021-12-30 ENCOUNTER — HOSPITAL ENCOUNTER (OUTPATIENT)
Dept: ONCOLOGY | Age: 36
Discharge: HOME | End: 2021-12-30
Attending: INTERNAL MEDICINE | Admitting: INTERNAL MEDICINE
Payer: COMMERCIAL

## 2021-12-30 VITALS
HEART RATE: 102 BPM | RESPIRATION RATE: 18 BRPM | TEMPERATURE: 98.3 F | SYSTOLIC BLOOD PRESSURE: 130 MMHG | OXYGEN SATURATION: 100 % | DIASTOLIC BLOOD PRESSURE: 98 MMHG

## 2021-12-30 VITALS
HEART RATE: 105 BPM | OXYGEN SATURATION: 96 % | TEMPERATURE: 97.3 F | RESPIRATION RATE: 18 BRPM | DIASTOLIC BLOOD PRESSURE: 80 MMHG | SYSTOLIC BLOOD PRESSURE: 120 MMHG

## 2021-12-30 DIAGNOSIS — M86.9 OSTEOMYELITIS OF LEFT FOOT, UNSPECIFIED TYPE (HCC): Primary | ICD-10-CM
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
## 2021-12-30 LAB
ABSOLUTE EOS #: 0.33 K/UL (ref 0–0.44)
ABSOLUTE IMMATURE GRANULOCYTE: 0 K/UL (ref 0–0.3)
ABSOLUTE LYMPH #: 3.44 K/UL (ref 1.1–3.7)
ABSOLUTE MONO #: 0.89 K/UL (ref 0.1–1.2)
ALBUMIN SERPL-MCNC: 4 G/DL (ref 3.5–5.2)
ALBUMIN/GLOBULIN RATIO: 1 (ref 1–2.5)
ALP BLD-CCNC: 120 U/L (ref 40–129)
ALT SERPL-CCNC: 47 U/L (ref 5–41)
AMMONIA: 25 UMOL/L (ref 16–60)
ANION GAP SERPL CALCULATED.3IONS-SCNC: 15 MMOL/L (ref 9–17)
AST SERPL-CCNC: 29 U/L
BASOPHILS # BLD: 1 % (ref 0–2)
BASOPHILS ABSOLUTE: 0.11 K/UL (ref 0–0.2)
BILIRUB SERPL-MCNC: 0.54 MG/DL (ref 0.3–1.2)
BNP INTERPRETATION: NORMAL
BUN BLDV-MCNC: 22 MG/DL (ref 6–20)
BUN/CREAT BLD: ABNORMAL (ref 9–20)
CALCIUM SERPL-MCNC: 9.1 MG/DL (ref 8.6–10.4)
CHLORIDE BLD-SCNC: 102 MMOL/L (ref 98–107)
CO2: 22 MMOL/L (ref 20–31)
CREAT SERPL-MCNC: 0.74 MG/DL (ref 0.7–1.2)
DIFFERENTIAL TYPE: ABNORMAL
EOSINOPHILS RELATIVE PERCENT: 3 % (ref 1–4)
GFR AFRICAN AMERICAN: >60 ML/MIN
GFR NON-AFRICAN AMERICAN: >60 ML/MIN
GFR SERPL CREATININE-BSD FRML MDRD: ABNORMAL ML/MIN/{1.73_M2}
GFR SERPL CREATININE-BSD FRML MDRD: ABNORMAL ML/MIN/{1.73_M2}
GLUCOSE BLD-MCNC: 113 MG/DL (ref 70–99)
HCT VFR BLD CALC: 46.4 % (ref 40.7–50.3)
HEMOGLOBIN: 13.7 G/DL (ref 13–17)
IMMATURE GRANULOCYTES: 0 %
LYMPHOCYTES # BLD: 31 % (ref 24–43)
MAGNESIUM: 2 MG/DL (ref 1.6–2.6)
MCH RBC QN AUTO: 20.6 PG (ref 25.2–33.5)
MCHC RBC AUTO-ENTMCNC: 29.5 G/DL (ref 28.4–34.8)
MCV RBC AUTO: 69.8 FL (ref 82.6–102.9)
MONOCYTES # BLD: 8 % (ref 3–12)
MORPHOLOGY: ABNORMAL
MORPHOLOGY: ABNORMAL
NRBC AUTOMATED: 0 PER 100 WBC
PDW BLD-RTO: 18.7 % (ref 11.8–14.4)
PHOSPHORUS: 3.6 MG/DL (ref 2.5–4.5)
PLATELET # BLD: 256 K/UL (ref 138–453)
PLATELET ESTIMATE: ABNORMAL
PMV BLD AUTO: 9 FL (ref 8.1–13.5)
POTASSIUM SERPL-SCNC: 4 MMOL/L (ref 3.7–5.3)
PRO-BNP: 78 PG/ML
PROLACTIN: 7.06 UG/L (ref 4.04–15.2)
RBC # BLD: 6.65 M/UL (ref 4.21–5.77)
RBC # BLD: ABNORMAL 10*6/UL
SEG NEUTROPHILS: 57 % (ref 36–65)
SEGMENTED NEUTROPHILS ABSOLUTE COUNT: 6.33 K/UL (ref 1.5–8.1)
SODIUM BLD-SCNC: 139 MMOL/L (ref 135–144)
TOTAL PROTEIN: 7.9 G/DL (ref 6.4–8.3)
TROPONIN INTERP: NORMAL
TROPONIN T: NORMAL NG/ML
TROPONIN, HIGH SENSITIVITY: <6 NG/L (ref 0–22)
WBC # BLD: 11.1 K/UL (ref 3.5–11.3)
WBC # BLD: ABNORMAL 10*3/UL

## 2021-12-30 PROCEDURE — 96374 THER/PROPH/DIAG INJ IV PUSH: CPT

## 2021-12-30 PROCEDURE — 84484 ASSAY OF TROPONIN QUANT: CPT

## 2021-12-30 PROCEDURE — 99285 EMERGENCY DEPT VISIT HI MDM: CPT

## 2021-12-30 PROCEDURE — 83735 ASSAY OF MAGNESIUM: CPT

## 2021-12-30 PROCEDURE — 72125 CT NECK SPINE W/O DYE: CPT

## 2021-12-30 PROCEDURE — 83880 ASSAY OF NATRIURETIC PEPTIDE: CPT

## 2021-12-30 PROCEDURE — 2580000003 HC RX 258: Performed by: INTERNAL MEDICINE

## 2021-12-30 PROCEDURE — 80053 COMPREHEN METABOLIC PANEL: CPT

## 2021-12-30 PROCEDURE — 6370000000 HC RX 637 (ALT 250 FOR IP): Performed by: STUDENT IN AN ORGANIZED HEALTH CARE EDUCATION/TRAINING PROGRAM

## 2021-12-30 PROCEDURE — 84100 ASSAY OF PHOSPHORUS: CPT

## 2021-12-30 PROCEDURE — 93005 ELECTROCARDIOGRAM TRACING: CPT | Performed by: STUDENT IN AN ORGANIZED HEALTH CARE EDUCATION/TRAINING PROGRAM

## 2021-12-30 PROCEDURE — 82140 ASSAY OF AMMONIA: CPT

## 2021-12-30 PROCEDURE — 85025 COMPLETE CBC W/AUTO DIFF WBC: CPT

## 2021-12-30 PROCEDURE — 6360000002 HC RX W HCPCS: Performed by: INTERNAL MEDICINE

## 2021-12-30 PROCEDURE — 84146 ASSAY OF PROLACTIN: CPT

## 2021-12-30 PROCEDURE — 70450 CT HEAD/BRAIN W/O DYE: CPT

## 2021-12-30 RX ORDER — SODIUM CHLORIDE 9 MG/ML
INJECTION, SOLUTION INTRAVENOUS CONTINUOUS
Status: CANCELLED | OUTPATIENT
Start: 2021-12-31

## 2021-12-30 RX ORDER — ALBUTEROL SULFATE 90 UG/1
4 AEROSOL, METERED RESPIRATORY (INHALATION) PRN
Status: CANCELLED | OUTPATIENT
Start: 2021-12-31

## 2021-12-30 RX ORDER — SODIUM CHLORIDE 9 MG/ML
25 INJECTION, SOLUTION INTRAVENOUS PRN
Status: CANCELLED | OUTPATIENT
Start: 2021-12-31

## 2021-12-30 RX ORDER — ONDANSETRON 2 MG/ML
8 INJECTION INTRAMUSCULAR; INTRAVENOUS
Status: CANCELLED | OUTPATIENT
Start: 2021-12-31

## 2021-12-30 RX ORDER — HEPARIN SODIUM (PORCINE) LOCK FLUSH IV SOLN 100 UNIT/ML 100 UNIT/ML
500 SOLUTION INTRAVENOUS PRN
Status: CANCELLED | OUTPATIENT
Start: 2021-12-31

## 2021-12-30 RX ORDER — SODIUM CHLORIDE 0.9 % (FLUSH) 0.9 %
5-40 SYRINGE (ML) INJECTION PRN
Status: CANCELLED | OUTPATIENT
Start: 2021-12-31

## 2021-12-30 RX ORDER — ACETAMINOPHEN 500 MG
1000 TABLET ORAL ONCE
Status: COMPLETED | OUTPATIENT
Start: 2021-12-30 | End: 2021-12-30

## 2021-12-30 RX ORDER — ACETAMINOPHEN 325 MG/1
650 TABLET ORAL
Status: CANCELLED | OUTPATIENT
Start: 2021-12-31

## 2021-12-30 RX ORDER — DIPHENHYDRAMINE HYDROCHLORIDE 50 MG/ML
50 INJECTION INTRAMUSCULAR; INTRAVENOUS
Status: CANCELLED | OUTPATIENT
Start: 2021-12-31

## 2021-12-30 RX ORDER — EPINEPHRINE 1 MG/ML
0.3 INJECTION, SOLUTION, CONCENTRATE INTRAVENOUS PRN
Status: CANCELLED | OUTPATIENT
Start: 2021-12-31

## 2021-12-30 RX ADMIN — DAPTOMYCIN 400 MG: 500 INJECTION, POWDER, LYOPHILIZED, FOR SOLUTION INTRAVENOUS at 17:30

## 2021-12-30 RX ADMIN — ACETAMINOPHEN 1000 MG: 500 TABLET ORAL at 20:30

## 2021-12-30 ASSESSMENT — ENCOUNTER SYMPTOMS
RHINORRHEA: 0
SORE THROAT: 0
PHOTOPHOBIA: 0
ABDOMINAL PAIN: 0
SHORTNESS OF BREATH: 0

## 2021-12-30 ASSESSMENT — PAIN SCALES - GENERAL
PAINLEVEL_OUTOF10: 8
PAINLEVEL_OUTOF10: 0
PAINLEVEL_OUTOF10: 8

## 2021-12-30 ASSESSMENT — PAIN DESCRIPTION - PAIN TYPE: TYPE: ACUTE PAIN

## 2021-12-30 ASSESSMENT — PAIN DESCRIPTION - ORIENTATION: ORIENTATION: POSTERIOR

## 2021-12-30 ASSESSMENT — PAIN DESCRIPTION - FREQUENCY: FREQUENCY: CONTINUOUS

## 2021-12-30 ASSESSMENT — PAIN DESCRIPTION - LOCATION: LOCATION: NECK;HEAD

## 2021-12-30 ASSESSMENT — PAIN DESCRIPTION - DESCRIPTORS: DESCRIPTORS: SORE

## 2021-12-30 NOTE — PROGRESS NOTES
Patient requires Ultra Sound starts. RN had to call private [employer] Access RN to start an IV line. This took several hours for Access RN to arrive, extending the patients stay. Patient is in the custody of the [employer]. During infusion patient rolled out of bed.

## 2021-12-31 ENCOUNTER — HOSPITAL ENCOUNTER (OUTPATIENT)
Dept: ONCOLOGY | Age: 36
Discharge: HOME | End: 2021-12-31
Attending: INTERNAL MEDICINE | Admitting: INTERNAL MEDICINE
Payer: COMMERCIAL

## 2021-12-31 VITALS
TEMPERATURE: 98.2 F | SYSTOLIC BLOOD PRESSURE: 120 MMHG | HEART RATE: 105 BPM | OXYGEN SATURATION: 100 % | DIASTOLIC BLOOD PRESSURE: 93 MMHG | RESPIRATION RATE: 18 BRPM

## 2021-12-31 DIAGNOSIS — M86.9 OSTEOMYELITIS OF LEFT FOOT, UNSPECIFIED TYPE (HCC): Primary | ICD-10-CM

## 2021-12-31 LAB
EKG ATRIAL RATE: 110 BPM
EKG P AXIS: 54 DEGREES
EKG P-R INTERVAL: 132 MS
EKG Q-T INTERVAL: 348 MS
EKG QRS DURATION: 90 MS
EKG QTC CALCULATION (BAZETT): 470 MS
EKG R AXIS: 55 DEGREES
EKG T AXIS: 33 DEGREES
EKG VENTRICULAR RATE: 110 BPM

## 2021-12-31 PROCEDURE — 6360000002 HC RX W HCPCS: Performed by: INTERNAL MEDICINE

## 2021-12-31 PROCEDURE — 2580000003 HC RX 258: Performed by: INTERNAL MEDICINE

## 2021-12-31 PROCEDURE — 96365 THER/PROPH/DIAG IV INF INIT: CPT

## 2021-12-31 PROCEDURE — 93010 ELECTROCARDIOGRAM REPORT: CPT | Performed by: INTERNAL MEDICINE

## 2021-12-31 RX ORDER — EPINEPHRINE 1 MG/ML
0.3 INJECTION, SOLUTION, CONCENTRATE INTRAVENOUS PRN
Status: CANCELLED | OUTPATIENT
Start: 2022-01-01

## 2021-12-31 RX ORDER — HEPARIN SODIUM (PORCINE) LOCK FLUSH IV SOLN 100 UNIT/ML 100 UNIT/ML
500 SOLUTION INTRAVENOUS PRN
Status: CANCELLED | OUTPATIENT
Start: 2022-01-01

## 2021-12-31 RX ORDER — SODIUM CHLORIDE 9 MG/ML
25 INJECTION, SOLUTION INTRAVENOUS PRN
Status: CANCELLED | OUTPATIENT
Start: 2022-01-01

## 2021-12-31 RX ORDER — ACETAMINOPHEN 325 MG/1
650 TABLET ORAL
Status: CANCELLED | OUTPATIENT
Start: 2022-01-01

## 2021-12-31 RX ORDER — SODIUM CHLORIDE 0.9 % (FLUSH) 0.9 %
5-40 SYRINGE (ML) INJECTION PRN
Status: CANCELLED | OUTPATIENT
Start: 2022-01-01

## 2021-12-31 RX ORDER — SODIUM CHLORIDE 9 MG/ML
INJECTION, SOLUTION INTRAVENOUS CONTINUOUS
Status: CANCELLED | OUTPATIENT
Start: 2022-01-01

## 2021-12-31 RX ORDER — DIPHENHYDRAMINE HYDROCHLORIDE 50 MG/ML
50 INJECTION INTRAMUSCULAR; INTRAVENOUS
Status: CANCELLED | OUTPATIENT
Start: 2022-01-01

## 2021-12-31 RX ORDER — ALBUTEROL SULFATE 90 UG/1
4 AEROSOL, METERED RESPIRATORY (INHALATION) PRN
Status: CANCELLED | OUTPATIENT
Start: 2022-01-01

## 2021-12-31 RX ORDER — ONDANSETRON 2 MG/ML
8 INJECTION INTRAMUSCULAR; INTRAVENOUS
Status: CANCELLED | OUTPATIENT
Start: 2022-01-01

## 2021-12-31 RX ADMIN — SODIUM CHLORIDE 400 MG: 9 INJECTION, SOLUTION INTRAVENOUS at 16:27
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
## 2021-12-31 NOTE — ED NOTES
Pt to ED for seizure. Pt was admitted to [facility] and does not remember the events specifically because he had + LOC. Pt states the back of his neck is sore and his tongue hurts as well. He is alert and oriented as of currently and tachycardic. All other VSS. Pt in police custody and denies any further needs at this time.       Jess Benito RN  12/30/21 5488

## 2021-12-31 NOTE — ED PROVIDER NOTES
[facility] [address] [facility]  Emergency Department  Emergency Medicine Resident Sign-out     Care of Michelle De La Cruz was assumed from Dr. Forrest Jacome and is being seen for Seizures  . The patient's initial evaluation and plan have been discussed with the prior provider who initially evaluated the patient. EMERGENCY DEPARTMENT COURSE / MEDICAL DECISION MAKING:       MEDICATIONS GIVEN:  Orders Placed This Encounter   Medications    acetaminophen (TYLENOL) tablet 1,000 mg       LABS / RADIOLOGY:     Labs Reviewed   CBC WITH AUTO DIFFERENTIAL - Abnormal; Notable for the following components:       Result Value    RBC 6.65 (*)     MCV 69.8 (*)     MCH 20.6 (*)     RDW 18.7 (*)     All other components within normal limits   COMPREHENSIVE METABOLIC PANEL - Abnormal; Notable for the following components:    Glucose 113 (*)     BUN 22 (*)     ALT 47 (*)     All other components within normal limits   AMMONIA   PROLACTIN   TROPONIN   BRAIN NATRIURETIC PEPTIDE   PHOSPHORUS   MAGNESIUM       XR TIBIA FIBULA LEFT (2 VIEWS)    Result Date: 12/16/2021  EXAMINATION: XRAY VIEWS OF THE LEFT TIBIA AND FIBULA 12/16/2021 12:29 pm COMPARISON: None. HISTORY: ORDERING SYSTEM PROVIDED HISTORY: osteo TECHNOLOGIST PROVIDED HISTORY: osteo FINDINGS: There is no evidence of acute fracture. There is normal alignment. No acute joint abnormality. No focal osseous lesion. No focal soft tissue abnormality. No acute osseous abnormality. XR ANKLE LEFT (MIN 3 VIEWS)    Result Date: 12/16/2021  EXAMINATION: THREE XRAY VIEWS OF THE LEFT ANKLE; THREE XRAY VIEWS OF THE LEFT FOOT 12/16/2021 3:29 pm COMPARISON: None. HISTORY: ORDERING SYSTEM PROVIDED HISTORY: osteo TECHNOLOGIST PROVIDED HISTORY: osteo Recent diagnosis of blood clots left leg. Patient left outside facility AMA. Valve replacement July 2021. Recent cocaine use. FINDINGS: Left ankle: No acute fracture, dislocation or bony destruction is noted. Ankle mortise is uniform. Talar dome and talar walls are intact. Diffuse mild soft tissue swelling is noted. Left foot: Soft tissue swelling of the forefoot is noted. Patient has demineralization in the proximal 1st proximal phalanx and in the 1st metatarsal head without cortical destruction. Similar findings are present in the D IP joint. Differential diagnosis includes osteomyelitis or erosive arthritis. Left ankle: No acute bony abnormality. Left foot: Demineralization on both sides of the 1st metatarsal-phalangeal and D IP joints the great toe with soft tissue swelling. No cortical destruction but findings may be compatible with osteomyelitis less likely erosive osteoarthritis. RECOMMENDATION: Radionuclide bone imaging versus MRI study of the foot advised. XR FOOT LEFT (MIN 3 VIEWS)    Result Date: 12/16/2021  EXAMINATION: THREE XRAY VIEWS OF THE LEFT ANKLE; THREE XRAY VIEWS OF THE LEFT FOOT 12/16/2021 3:29 pm COMPARISON: None. HISTORY: ORDERING SYSTEM PROVIDED HISTORY: osteo TECHNOLOGIST PROVIDED HISTORY: osteo Recent diagnosis of blood clots left leg. Patient left outside facility AMA. Valve replacement July 2021. Recent cocaine use. FINDINGS: Left ankle: No acute fracture, dislocation or bony destruction is noted. Ankle mortise is uniform. Talar dome and talar walls are intact. Diffuse mild soft tissue swelling is noted. Left foot: Soft tissue swelling of the forefoot is noted. Patient has demineralization in the proximal 1st proximal phalanx and in the 1st metatarsal head without cortical destruction. Similar findings are present in the D IP joint. Differential diagnosis includes osteomyelitis or erosive arthritis. Left ankle: No acute bony abnormality. Left foot: Demineralization on both sides of the 1st metatarsal-phalangeal and D IP joints the great toe with soft tissue swelling. No cortical destruction but findings may be compatible with osteomyelitis less likely erosive osteoarthritis.  RECOMMENDATION: Radionuclide bone imaging versus MRI study of the foot advised. CT HEAD WO CONTRAST    Result Date: 12/30/2021  EXAMINATION: CT OF THE HEAD WITHOUT CONTRAST  12/30/2021 7:57 pm TECHNIQUE: CT of the head was performed without the administration of intravenous contrast. Dose modulation, iterative reconstruction, and/or weight based adjustment of the mA/kV was utilized to reduce the radiation dose to as low as reasonably achievable. COMPARISON: 10/09/2016 HISTORY: ORDERING SYSTEM PROVIDED HISTORY: fall TECHNOLOGIST PROVIDED HISTORY: fall Decision Support Exception - unselect if not a suspected or confirmed emergency medical condition->Emergency Medical Condition (MA) Reason for Exam: fall from bed FINDINGS: BRAIN/VENTRICLES: There is no acute intracranial hemorrhage, mass effect or midline shift. No abnormal extra-axial fluid collection. The gray-white differentiation is maintained without evidence of an acute infarct. There is no evidence of hydrocephalus. ORBITS: The visualized portion of the orbits demonstrate no acute abnormality. SINUSES: The visualized paranasal sinuses and mastoid air cells demonstrate no acute abnormality. SOFT TISSUES/SKULL:  No acute abnormality of the visualized skull or soft tissues. No acute intracranial abnormality. CT CERVICAL SPINE WO CONTRAST    Result Date: 12/30/2021  EXAMINATION: CT OF THE CERVICAL SPINE WITHOUT CONTRAST 12/30/2021 7:57 pm TECHNIQUE: CT of the cervical spine was performed without the administration of intravenous contrast. Multiplanar reformatted images are provided for review. Dose modulation, iterative reconstruction, and/or weight based adjustment of the mA/kV was utilized to reduce the radiation dose to as low as reasonably achievable.  COMPARISON: MRI 10/11/2016 HISTORY: ORDERING SYSTEM PROVIDED HISTORY: fall from bed TECHNOLOGIST PROVIDED HISTORY: fall from bed Decision Support Exception - unselect if not a suspected or confirmed emergency medical condition->Emergency Medical Condition (MA) Reason for Exam: fall from bed FINDINGS: BONES/ALIGNMENT: There is no acute fracture or traumatic malalignment. DEGENERATIVE CHANGES: No significant degenerative changes. SOFT TISSUES: There is no prevertebral soft tissue swelling. No acute abnormality of the cervical spine. XR CHEST PORTABLE    Result Date: 12/19/2021  EXAMINATION: ONE XRAY VIEW OF THE CHEST 12/19/2021 3:11 pm COMPARISON: 12/16/2021 HISTORY: ORDERING SYSTEM PROVIDED HISTORY: shortness of breath TECHNOLOGIST PROVIDED HISTORY: shortness of breath FINDINGS: Stable bilateral nodule opacities most notably in the left base. Small bilateral pleural effusions. .The cardiac size is normal..  Pulmonary vascularity appears normal.. Thresa Abed No acute bony abnormalities. The hilar structures are normal.     Stable chest when compared to the prior exam     XR CHEST PORTABLE    Result Date: 12/16/2021  EXAMINATION: ONE XRAY VIEW OF THE CHEST 12/16/2021 12:29 pm COMPARISON: 06/21/2019 HISTORY: ORDERING SYSTEM PROVIDED HISTORY: pulmonary emboli on ct yesterday left ama TECHNOLOGIST PROVIDED HISTORY: pulmonary emboli on ct yesterday left ama FINDINGS: Patchy bilateral nodular opacities in the lungs more in the left base. .The cardiac size is normal. Small bilateral pleural effusions. Pulmonary vascularity appears normal..  Valvular prosthesis. No acute bony abnormalities. The hilar structures are normal.     Patchy bilateral nodular opacities in the lungs more in the left base.  Consider follow-up CT     MRI FOOT LEFT W WO CONTRAST    Result Date: 12/17/2021  EXAMINATION: MRI OF THE LEFT FOOT WITH AND WITHOUT CONTRAST, 12/17/2021 10:12 am TECHNIQUE: Multiplanar multisequence MRI of the left foot was performed with and without the administration of intravenous contrast. COMPARISON: None HISTORY: ORDERING SYSTEM PROVIDED HISTORY: osteomyelitis TECHNOLOGIST PROVIDED HISTORY: osteomyelitis What is the area of interest?->Forefoot Reason for Exam: osteomyelitis FINDINGS: LISFRANC JOINT: Anatomic alignment of the Lisfranc joint. The Lisfranc ligament remains intact. BONE MARROW: Marrow edema, confluent decreased T1 signal, and postcontrast enhancement involving the 1st metatarsal head and proximal phalanx of the great toe consistent with osteomyelitis. Intraosseous abscess present at the 1st metatarsal head and neck and proximal phalanx which demonstrates peripheral enhancement. Edema and enhancement extends throughout the 1st metatarsal to the 1st metatarsal base. No fracture identified. 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 Effusion of the 1st MTP joint. Septic joint not excluded. Anatomic alignment of the joints. Mild midfoot osteoarthritis. SOFT TISSUES: Subcutaneous edema with enhancement of the soft tissues about the great toe consistent with cellulitis. No organized drainable collection identified. TENDONS: Flexor extensor tendons appear intact. No tenosynovitis. 1. Soft tissue edema and enhancement about the great toe most suggestive of cellulitis. Underlying moderate 1st MTP joint effusion and osteomyelitis of the proximal phalanx and 1st metatarsal with findings concerning for septic joint. Intraosseous abscess present the proximal phalanx and 1st metatarsal head. Edema and enhancement extend to the 1st metatarsal base.  RECOMMENDATIONS: Unavailable     MRI TIBIA FIBULA LEFT W WO CONTRAST    Result Date: 12/18/2021  EXAMINATION: MRI OF THE LEFT TIBIA/FIBULA WITHOUT AND WITH CONTRAST 12/18/2021 5:36 pm TECHNIQUE: Multiplanar multisequence MRI of the left tibia/fibula was performed without and with the administration of intravenous contrast. COMPARISON: MRI left foot prior day; left tibia and fibula, ankle, and foot radiograph December 16, 2021 HISTORY: ORDERING SYSTEM PROVIDED HISTORY: Rule out abscess TECHNOLOGIST PROVIDED HISTORY: Rule out abscess Reason for Exam: rule out abscess FINDINGS: Bone marrow: No osseous contusion, fracture, or suspicious marrow occupying lesion identified. No definite marrow edema or postcontrast enhancement identified. Soft tissues: Organized fluid collection extending along the superficial aspect of the medial head of the gastrocnemius. The collection measures 1.8 x 5.8 x 18.7 cm and demonstrates peripheral enhancement on the postcontrast images. While sterility of fluid is indeterminate on imaging, abscess cannot be excluded Joints: The exam was not optimized for evaluation of the joints. Anatomic alignment of the partially imaged ankle. 1. Large organized collection along the superficial aspect of the medial head of the gastrocnemius. The collection measures 1.8 x 5.8 x 18.7 cm and does demonstrate peripheral enhancement. While sterility of fluid is indeterminate on imaging, abscess cannot be excluded. 2. No osteomyelitis identified. VL DUP LOWER EXTREMITY VENOUS BILATERAL    Result Date: 12/19/2021    [facility]  Vascular Lower Extremities DVT Study Procedure   Patient Name   Adonis Zavala    Date of Study           12/18/2021                 Marisabel Louis   Date of Birth  1985  Gender                  Male   Age            39 year(s)  Race                       Room Number    0234   Corporate ID # [id number]   Patient Acct # [de-identified]   MR #           [id number]     Margarita Yanes, [facility]   Accession #    [id number]  Interpreting Physician  Hermelinda Cogan   Referring                  Referring Physician     Xi Arciniega DPM  Nurse  Practitioner  Procedure Type of Study:   Veins: Lower Extremities DVT Study, Venous Scan Lower Bilateral.  Indications for Study:Pain, leg. Patient Status: In Patient. Technical Quality:Adequate visualization. Conclusions   Summary   No evidence of superficial or deep venous thrombosis in both lower  extremities. Enlarged lymph nodes noted in both groins.    Signature ----------------------------------------------------------------  Electronically signed by Damon Ambrose RVT(Sonographer) on  12/18/2021 02:15 PM  ----------------------------------------------------------------   ----------------------------------------------------------------  Electronically signed by Suzzanna Caroli Reyes,Arthur(Interpreting  physician) on 12/19/2021 06:58 PM  ----------------------------------------------------------------  Findings:   Right Impression:                    Left Impression:  The common femoral, femoral,         The common femoral, femoral,  popliteal and tibial veins           popliteal and tibial veins  demonstrate normal compressibility   demonstrate normal compressibility  and augmentation. and augmentation. Normal compressibility of the great  Normal compressibility of the great  saphenous vein. saphenous vein. Normal compressibility of the small  Normal compressibility of the small  saphenous vein. saphenous vein. Enlarged lymph nodes are noted at    Enlarged lymph nodes are noted at the  the right groin level. left groin level. Incidental finding of a non vascular                                       structure in the mid calf measuring                                       1.01 cm x 3.81 cm. Velocities are measured in cm/s ; Diameters are measured in cm Right Lower Extremities DVT Study Measurements Right 2D Measurements +------------------------------------+----------+---------------+----------+ ! Location                            ! Visualized! Compressibility! Thrombosis! +------------------------------------+----------+---------------+----------+ ! Common Femoral                      !Yes       ! Yes            ! None      ! +------------------------------------+----------+---------------+----------+ ! Prox Femoral                        !Yes       ! Yes !None      ! +------------------------------------+----------+---------------+----------+ ! Mid Femoral                         !Yes       ! Yes            ! None      ! +------------------------------------+----------+---------------+----------+ ! Dist Femoral                        !Yes       ! Yes            ! None      ! +------------------------------------+----------+---------------+----------+ ! Popliteal                           !Yes       ! Yes            ! None      ! +------------------------------------+----------+---------------+----------+ ! Sapheno Femoral Junction            ! Yes       ! Yes            ! None      ! +------------------------------------+----------+---------------+----------+ ! PTV                                 ! Yes       ! Yes            ! None      ! +------------------------------------+----------+---------------+----------+ ! Peroneal                            !Partial   !Yes            ! None      ! +------------------------------------+----------+---------------+----------+ ! Gastroc                             ! Yes       ! Yes            ! None      ! +------------------------------------+----------+---------------+----------+ ! GSV Thigh                           ! Yes       ! Yes            ! None      ! +------------------------------------+----------+---------------+----------+ ! GSV Knee                            ! Yes       ! Yes            ! None      ! +------------------------------------+----------+---------------+----------+ ! GSV Ankle                           ! Yes       ! Yes            ! None      ! +------------------------------------+----------+---------------+----------+ ! SSV                                 ! Yes       ! Yes            ! None      ! +------------------------------------+----------+---------------+----------+ Right Doppler Measurements +---------------------------+------+------+--------------------------------+ ! Location                   ! Signal!Reflux! Reflux (msec) ! +---------------------------+------+------+--------------------------------+ ! Common Femoral             !Phasic!      !                                ! +---------------------------+------+------+--------------------------------+ ! Prox Femoral               !Phasic!      !                                ! +---------------------------+------+------+--------------------------------+ ! Popliteal                  !Phasic!      !                                ! +---------------------------+------+------+--------------------------------+ Left Lower Extremities DVT Study Measurements Left 2D Measurements +------------------------------------+----------+---------------+----------+ ! Location                            ! Visualized! Compressibility! Thrombosis! +------------------------------------+----------+---------------+----------+ ! Common Femoral                      !Yes       ! Yes            ! None      ! +------------------------------------+----------+---------------+----------+ ! Prox Femoral                        !Yes       ! Yes            ! None      ! +------------------------------------+----------+---------------+----------+ ! Mid Femoral                         !Yes       ! Yes            ! None      ! +------------------------------------+----------+---------------+----------+ ! Dist Femoral                        !Yes       ! Yes            ! None      ! +------------------------------------+----------+---------------+----------+ ! Popliteal                           !Yes       ! Yes            ! None      ! +------------------------------------+----------+---------------+----------+ ! Sapheno Femoral Junction            ! Yes       ! Yes            ! None      ! +------------------------------------+----------+---------------+----------+ ! PTV                                 ! Yes       ! Yes            ! None      ! +------------------------------------+----------+---------------+----------+ ! Mateo !Yes       !Yes            ! None      ! +------------------------------------+----------+---------------+----------+ ! Gastroc                             ! Yes       ! Yes            ! None      ! +------------------------------------+----------+---------------+----------+ ! GSV Thigh                           ! Yes       ! Yes            ! None      ! +------------------------------------+----------+---------------+----------+ ! GSV Knee                            ! Yes       ! Yes            ! None      ! +------------------------------------+----------+---------------+----------+ ! GSV Ankle                           ! Yes       ! Yes            ! None      ! +------------------------------------+----------+---------------+----------+ ! SSV                                 ! Yes       ! Yes            ! None      ! +------------------------------------+----------+---------------+----------+ Left Doppler Measurements +---------------------------+------+------+--------------------------------+ ! Location                   ! Signal!Reflux! Reflux (msec)                   ! +---------------------------+------+------+--------------------------------+ ! Common Femoral             !Phasic!      !                                ! +---------------------------+------+------+--------------------------------+ ! Prox Femoral               !Phasic!      !                                ! +---------------------------+------+------+--------------------------------+ ! Popliteal                  !Phasic!      !                                ! +---------------------------+------+------+--------------------------------+    US EXTREMITY LEFT NON VASC LIMITED    Result Date: 12/19/2021  EXAMINATION: NONVASCULAR ULTRASOUND OF THE LEFT EXTREMITY 12/18/2021 10:22 am COMPARISON: None.  HISTORY: ORDERING SYSTEM PROVIDED HISTORY: Pain in left calf; patient reports abscess TECHNOLOGIST PROVIDED HISTORY: Pain in left calf; patient reports abscess FINDINGS: Direct scanning at the site of \"swelling\" in the left calf was performed. By ultrasound no regional fluid collection is seen to suggest abscess. Within the soft tissues there is a somewhat heterogeneous slightly hypoechoic ovoid area measuring up to 1.7 cm depth x 11.7 cm length x 7.3 cm transverse. It appears to be related to the muscle, likely the gastrocnemius. It is possible this is a complex collection adjacent to the muscle. No significant increased color flow was present. Given slight loss of the typical striated appearance of muscle, muscle injury/edema, myositis, or possibly infection should be considered. MRI is suggested for further evaluation. No convincing sonographic findings typical of abscess. Calf muscle is mildly heterogeneous and hypoechoic suggesting possible inflammation/edema, injury, or myositis versus complex collection adjacent to the muscle. MRI is recommended for further evaluation. RECENT VITALS:     Temp: 97.3 °F (36.3 °C),  Pulse: 109, Resp: 18, BP: 120/80, SpO2: 96 %    This patient is a 39 y.o. Male with an episode where he rolled out of bed while in the hospital receiving daptomycin for osteomyelitis of his left great toe. Patient is incarcerated was in [employer] at the time there was some apparent shaking-like activity after patient was on the ground concern for seizure versus pseudoseizure. CT head and CT cervical spine and broad work-up all unremarkable patient ambulatory without any difficulty currently pending reevaluation after some p.o. fluids as patient remains mildly tachycardic       OUTSTANDING TASKS / RECOMMENDATIONS:    1. Reevaluate  2. Discharge     FINAL IMPRESSION:     1.  Fall from bed, initial encounter        DISPOSITION:         DISPOSITION:  [x]  Discharge   []  Transfer -    []  Admission -     []  Against Medical Advice   []  Eloped   FOLLOW-UP: [facility]  [address] [facility] [address] [phone]  Go to If symptoms worsen     DISCHARGE MEDICATIONS: New Prescriptions    No medications on file          Tonia Crespo DO  Emergency Medicine Resident  [facility]        Tonia Crespo [place]  Resident  12/30/21 [id number]

## 2021-12-31 NOTE — ED PROVIDER NOTES
[facility]     Emergency Department     Faculty Attestation    I performed a history and physical examination of the patient and discussed management with the resident. I have reviewed and agree with the residents findings including all diagnostic interpretations, and treatment plans as written. Any areas of disagreement are noted on the chart. I was personally present for the key portions of any procedures. I have documented in the chart those procedures where I was not present during the key portions. I have reviewed the emergency nurses triage note. I agree with the chief complaint, past medical history, past surgical history, allergies, medications, social and family history as documented unless otherwise noted below. Documentation of the HPI, Physical Exam and Medical Decision Making performed by [email or web address] is based on my personal performance of the HPI, PE and MDM. For Physician Assistant/ Nurse Practitioner cases/documentation I have personally evaluated this patient and have completed at least one if not all key elements of the E/M (history, physical exam, and MDM). Additional findings are as noted. Patient was getting antibiotic infusion for osteomyleitis. ?fall/ roll out of bed. Nursing came in and patient was on the floor. ?shaking episode v seizure, no h/o seizures. Patient in custody with , but not the ones present at the event. Patient c/o headache, and neck pain. He Is in handcuffs, but does have R arm free. Patient on exam, has pain diffusely in his neck, no signs of contusions, EOMI, patient awake and alert, no resp distress, abdomen soft, non distended, non tender to palpation. Moving all extremities equally, SILT in all extermities, no neuro deficits    Patient fall from bed? First time seizure, he is getting daptomycin infusion, low concern for drug reactions, check labs, he is slightly tachycardic, but also in pain.  Ct imaging, possible septic emboli in brain. Ct cervical to r/o traumatic injury. 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 Concern for worsening infection.     Maryhelen Goodpasture, D.O, M.P.H  Attending Emergency Medicine Physician         Maryhelen Goodpasture,   12/30/21 2022

## 2022-01-01 ENCOUNTER — HOSPITAL ENCOUNTER (OUTPATIENT)
Dept: ONCOLOGY | Age: 37
Discharge: HOME | End: 2022-01-01
Attending: INTERNAL MEDICINE | Admitting: INTERNAL MEDICINE
Payer: COMMERCIAL

## 2022-01-01 DIAGNOSIS — M86.9 OSTEOMYELITIS OF LEFT FOOT, UNSPECIFIED TYPE (HCC): Primary | ICD-10-CM

## 2022-01-01 PROCEDURE — 2580000003 HC RX 258: Performed by: INTERNAL MEDICINE

## 2022-01-01 PROCEDURE — 96365 THER/PROPH/DIAG IV INF INIT: CPT

## 2022-01-01 PROCEDURE — 6360000002 HC RX W HCPCS: Performed by: INTERNAL MEDICINE

## 2022-01-01 RX ORDER — DIPHENHYDRAMINE HYDROCHLORIDE 50 MG/ML
50 INJECTION INTRAMUSCULAR; INTRAVENOUS
Status: CANCELLED | OUTPATIENT
Start: 2022-01-02

## 2022-01-01 RX ORDER — HEPARIN SODIUM (PORCINE) LOCK FLUSH IV SOLN 100 UNIT/ML 100 UNIT/ML
500 SOLUTION INTRAVENOUS PRN
Status: CANCELLED | OUTPATIENT
Start: 2022-01-02

## 2022-01-01 RX ORDER — SODIUM CHLORIDE 0.9 % (FLUSH) 0.9 %
5-40 SYRINGE (ML) INJECTION PRN
Status: CANCELLED | OUTPATIENT
Start: 2022-01-02

## 2022-01-01 RX ORDER — ACETAMINOPHEN 325 MG/1
650 TABLET ORAL
Status: CANCELLED | OUTPATIENT
Start: 2022-01-02

## 2022-01-01 RX ORDER — SODIUM CHLORIDE 9 MG/ML
25 INJECTION, SOLUTION INTRAVENOUS PRN
Status: CANCELLED | OUTPATIENT
Start: 2022-01-02

## 2022-01-01 RX ORDER — EPINEPHRINE 1 MG/ML
0.3 INJECTION, SOLUTION, CONCENTRATE INTRAVENOUS PRN
Status: CANCELLED | OUTPATIENT
Start: 2022-01-02

## 2022-01-01 RX ORDER — ONDANSETRON 2 MG/ML
8 INJECTION INTRAMUSCULAR; INTRAVENOUS
Status: CANCELLED | OUTPATIENT
Start: 2022-01-02

## 2022-01-01 RX ORDER — SODIUM CHLORIDE 9 MG/ML
INJECTION, SOLUTION INTRAVENOUS CONTINUOUS
Status: CANCELLED | OUTPATIENT
Start: 2022-01-02

## 2022-01-01 RX ORDER — ALBUTEROL SULFATE 90 UG/1
4 AEROSOL, METERED RESPIRATORY (INHALATION) PRN
Status: CANCELLED | OUTPATIENT
Start: 2022-01-02

## 2022-01-01 RX ADMIN — DAPTOMYCIN 400 MG: 500 INJECTION, POWDER, LYOPHILIZED, FOR SOLUTION INTRAVENOUS at 18:14

## 2022-01-03 ENCOUNTER — HOSPITAL ENCOUNTER (OUTPATIENT)
Dept: ONCOLOGY | Age: 37
Discharge: HOME | End: 2022-01-03
Attending: INTERNAL MEDICINE | Admitting: INTERNAL MEDICINE
Payer: COMMERCIAL

## 2022-01-03 VITALS
RESPIRATION RATE: 18 BRPM | OXYGEN SATURATION: 100 % | SYSTOLIC BLOOD PRESSURE: 128 MMHG | DIASTOLIC BLOOD PRESSURE: 93 MMHG | HEART RATE: 92 BPM | TEMPERATURE: 98.3 F

## 2022-01-03 DIAGNOSIS — M86.9 OSTEOMYELITIS OF LEFT FOOT, UNSPECIFIED TYPE (HCC): Primary | ICD-10-CM

## 2022-01-03 PROCEDURE — 96365 THER/PROPH/DIAG IV INF INIT: CPT

## 2022-01-03 PROCEDURE — 76937 US GUIDE VASCULAR ACCESS: CPT

## 2022-01-03 PROCEDURE — 2580000003 HC RX 258: Performed by: INTERNAL MEDICINE

## 2022-01-03 PROCEDURE — 6360000002 HC RX W HCPCS: Performed by: INTERNAL MEDICINE

## 2022-01-03 RX ORDER — SODIUM CHLORIDE 9 MG/ML
INJECTION, SOLUTION INTRAVENOUS CONTINUOUS
Status: CANCELLED | OUTPATIENT
Start: 2022-01-04

## 2022-01-03 RX ORDER — ALBUTEROL SULFATE 90 UG/1
4 AEROSOL, METERED RESPIRATORY (INHALATION) PRN
Status: CANCELLED | OUTPATIENT
Start: 2022-01-04

## 2022-01-03 RX ORDER — ONDANSETRON 2 MG/ML
8 INJECTION INTRAMUSCULAR; INTRAVENOUS
Status: CANCELLED | OUTPATIENT
Start: 2022-01-04

## 2022-01-03 RX ORDER — DIPHENHYDRAMINE HYDROCHLORIDE 50 MG/ML
50 INJECTION INTRAMUSCULAR; INTRAVENOUS
Status: CANCELLED | OUTPATIENT
Start: 2022-01-04

## 2022-01-03 RX ORDER — SODIUM CHLORIDE 0.9 % (FLUSH) 0.9 %
5-40 SYRINGE (ML) INJECTION PRN
Status: CANCELLED | OUTPATIENT
Start: 2022-01-04

## 2022-01-03 RX ORDER — SODIUM CHLORIDE 9 MG/ML
25 INJECTION, SOLUTION INTRAVENOUS PRN
Status: CANCELLED | OUTPATIENT
Start: 2022-01-04

## 2022-01-03 RX ORDER — HEPARIN SODIUM (PORCINE) LOCK FLUSH IV SOLN 100 UNIT/ML 100 UNIT/ML
500 SOLUTION INTRAVENOUS PRN
Status: CANCELLED | OUTPATIENT
Start: 2022-01-04

## 2022-01-03 RX ORDER — ACETAMINOPHEN 325 MG/1
650 TABLET ORAL
Status: CANCELLED | OUTPATIENT
Start: 2022-01-04

## 2022-01-03 RX ORDER — EPINEPHRINE 1 MG/ML
0.3 INJECTION, SOLUTION, CONCENTRATE INTRAVENOUS PRN
Status: CANCELLED | OUTPATIENT
Start: 2022-01-04

## 2022-01-03 RX ADMIN — DAPTOMYCIN 400 MG: 500 INJECTION, POWDER, LYOPHILIZED, FOR SOLUTION INTRAVENOUS at 16:10

## 2022-01-04 ENCOUNTER — HOSPITAL ENCOUNTER (OUTPATIENT)
Dept: ONCOLOGY | Age: 37
Discharge: HOME | End: 2022-01-04
Attending: INTERNAL MEDICINE | Admitting: INTERNAL MEDICINE
Payer: COMMERCIAL

## 2022-01-04 DIAGNOSIS — M86.9 OSTEOMYELITIS OF LEFT FOOT, UNSPECIFIED TYPE (HCC): Primary | ICD-10-CM

## 2022-01-04 PROCEDURE — 2580000003 HC RX 258: Performed by: INTERNAL MEDICINE

## 2022-01-04 PROCEDURE — 96365 THER/PROPH/DIAG IV INF INIT: CPT

## 2022-01-04 PROCEDURE — 6360000002 HC RX W HCPCS: Performed by: INTERNAL MEDICINE

## 2022-01-04 PROCEDURE — 76937 US GUIDE VASCULAR ACCESS: CPT

## 2022-01-04 RX ORDER — ONDANSETRON 2 MG/ML
8 INJECTION INTRAMUSCULAR; INTRAVENOUS
OUTPATIENT
Start: 2022-01-05

## 2022-01-04 RX ORDER — ACETAMINOPHEN 325 MG/1
650 TABLET ORAL
OUTPATIENT
Start: 2022-01-05

## 2022-01-04 RX ORDER — HEPARIN SODIUM (PORCINE) LOCK FLUSH IV SOLN 100 UNIT/ML 100 UNIT/ML
500 SOLUTION INTRAVENOUS PRN
OUTPATIENT
Start: 2022-01-05

## 2022-01-04 RX ORDER — EPINEPHRINE 1 MG/ML
0.3 INJECTION, SOLUTION, CONCENTRATE INTRAVENOUS PRN
OUTPATIENT
Start: 2022-01-05

## 2022-01-04 RX ORDER — DIPHENHYDRAMINE HYDROCHLORIDE 50 MG/ML
50 INJECTION INTRAMUSCULAR; INTRAVENOUS
OUTPATIENT
Start: 2022-01-05

## 2022-01-04 RX ORDER — SODIUM CHLORIDE 9 MG/ML
25 INJECTION, SOLUTION INTRAVENOUS PRN
OUTPATIENT
Start: 2022-01-05

## 2022-01-04 RX ORDER — SODIUM CHLORIDE 0.9 % (FLUSH) 0.9 %
5-40 SYRINGE (ML) INJECTION PRN
OUTPATIENT
Start: 2022-01-05

## 2022-01-04 RX ORDER — ALBUTEROL SULFATE 90 UG/1
4 AEROSOL, METERED RESPIRATORY (INHALATION) PRN
OUTPATIENT
Start: 2022-01-05

## 2022-01-04 RX ORDER — SODIUM CHLORIDE 9 MG/ML
INJECTION, SOLUTION INTRAVENOUS CONTINUOUS
OUTPATIENT
Start: 2022-01-05

## 2022-01-04 RX ADMIN — SODIUM CHLORIDE 400 MG: 9 INJECTION, SOLUTION INTRAVENOUS at 16:30

## 2022-01-05 ENCOUNTER — HOSPITAL ENCOUNTER (OUTPATIENT)
Dept: ONCOLOGY | Age: 37
Discharge: HOME | End: 2022-01-05
Attending: INTERNAL MEDICINE | Admitting: INTERNAL MEDICINE
Payer: COMMERCIAL

## 2022-01-05 DIAGNOSIS — M86.9 OSTEOMYELITIS OF LEFT FOOT, UNSPECIFIED TYPE (HCC): Primary | ICD-10-CM

## 2022-01-05 PROCEDURE — 2580000003 HC RX 258: Performed by: INTERNAL MEDICINE

## 2022-01-05 PROCEDURE — 96365 THER/PROPH/DIAG IV INF INIT: CPT

## 2022-01-05 PROCEDURE — 6360000002 HC RX W HCPCS: Performed by: INTERNAL MEDICINE

## 2022-01-05 RX ORDER — ACETAMINOPHEN 325 MG/1
650 TABLET ORAL
OUTPATIENT
Start: 2022-01-06

## 2022-01-05 RX ORDER — SODIUM CHLORIDE 0.9 % (FLUSH) 0.9 %
5-40 SYRINGE (ML) INJECTION PRN
OUTPATIENT
Start: 2022-01-06

## 2022-01-05 RX ORDER — SODIUM CHLORIDE 9 MG/ML
25 INJECTION, SOLUTION INTRAVENOUS PRN
OUTPATIENT
Start: 2022-01-06

## 2022-01-05 RX ORDER — SODIUM CHLORIDE 9 MG/ML
INJECTION, SOLUTION INTRAVENOUS CONTINUOUS
OUTPATIENT
Start: 2022-01-06

## 2022-01-05 RX ORDER — ONDANSETRON 2 MG/ML
8 INJECTION INTRAMUSCULAR; INTRAVENOUS
OUTPATIENT
Start: 2022-01-06

## 2022-01-05 RX ORDER — DIPHENHYDRAMINE HYDROCHLORIDE 50 MG/ML
50 INJECTION INTRAMUSCULAR; INTRAVENOUS
OUTPATIENT
Start: 2022-01-06

## 2022-01-05 RX ORDER — EPINEPHRINE 1 MG/ML
0.3 INJECTION, SOLUTION, CONCENTRATE INTRAVENOUS PRN
OUTPATIENT
Start: 2022-01-06

## 2022-01-05 RX ORDER — HEPARIN SODIUM (PORCINE) LOCK FLUSH IV SOLN 100 UNIT/ML 100 UNIT/ML
500 SOLUTION INTRAVENOUS PRN
OUTPATIENT
Start: 2022-01-06

## 2022-01-05 RX ORDER — ALBUTEROL SULFATE 90 UG/1
4 AEROSOL, METERED RESPIRATORY (INHALATION) PRN
OUTPATIENT
Start: 2022-01-06

## 2022-01-05 RX ADMIN — SODIUM CHLORIDE 400 MG: 9 INJECTION, SOLUTION INTRAVENOUS at 16:59

## 2022-04-14 ENCOUNTER — HOSPITAL ENCOUNTER (INPATIENT)
Age: 37
LOS: 2 days | Discharge: HOME | DRG: 308 | End: 2022-04-17
Attending: EMERGENCY MEDICINE | Admitting: SURGERY
Payer: COMMERCIAL

## 2022-04-14 ENCOUNTER — APPOINTMENT (OUTPATIENT)
Dept: GENERAL RADIOLOGY | Age: 37
DRG: 308 | End: 2022-04-14
Payer: COMMERCIAL

## 2022-04-14 DIAGNOSIS — S72.22XA CLOSED DISPLACED SUBTROCHANTERIC FRACTURE OF LEFT FEMUR, INITIAL ENCOUNTER (HCC): Primary | ICD-10-CM

## 2022-04-14 DIAGNOSIS — S72.142A CLOSED INTERTROCHANTERIC FRACTURE OF LEFT FEMUR, INITIAL ENCOUNTER (HCC): ICD-10-CM

## 2022-04-14 LAB
ABSOLUTE EOS #: 0.34 K/UL (ref 0–0.44)
ABSOLUTE IMMATURE GRANULOCYTE: 0.06 K/UL (ref 0–0.3)
ABSOLUTE LYMPH #: 3.76 K/UL (ref 1.1–3.7)
ABSOLUTE MONO #: 0.69 K/UL (ref 0.1–1.2)
ANION GAP SERPL CALCULATED.3IONS-SCNC: 11 MMOL/L (ref 9–17)
BASOPHILS # BLD: 1 % (ref 0–2)
BASOPHILS ABSOLUTE: 0.06 K/UL (ref 0–0.2)
BUN BLDV-MCNC: 17 MG/DL (ref 6–20)
CALCIUM SERPL-MCNC: 8.8 MG/DL (ref 8.6–10.4)
CHLORIDE BLD-SCNC: 98 MMOL/L (ref 98–107)
CO2: 24 MMOL/L (ref 20–31)
CREAT SERPL-MCNC: 0.71 MG/DL (ref 0.7–1.2)
EOSINOPHILS RELATIVE PERCENT: 3 % (ref 1–4)
GFR AFRICAN AMERICAN: >60 ML/MIN
GFR NON-AFRICAN AMERICAN: >60 ML/MIN
GFR SERPL CREATININE-BSD FRML MDRD: ABNORMAL ML/MIN/{1.73_M2}
GLUCOSE BLD-MCNC: 138 MG/DL (ref 70–99)
HCT VFR BLD CALC: 44 % (ref 40.7–50.3)
HEMOGLOBIN: 13.6 G/DL (ref 13–17)
IMMATURE GRANULOCYTES: 1 %
INR BLD: 1.1
LYMPHOCYTES # BLD: 37 % (ref 24–43)
MCH RBC QN AUTO: 26.2 PG (ref 25.2–33.5)
MCHC RBC AUTO-ENTMCNC: 30.9 G/DL (ref 28.4–34.8)
MCV RBC AUTO: 84.8 FL (ref 82.6–102.9)
MONOCYTES # BLD: 7 % (ref 3–12)
NRBC AUTOMATED: 0 PER 100 WBC
PARTIAL THROMBOPLASTIN TIME: 23.3 SEC (ref 20.5–30.5)
PDW BLD-RTO: 18.8 % (ref 11.8–14.4)
PLATELET # BLD: 195 K/UL (ref 138–453)
PMV BLD AUTO: 9.3 FL (ref 8.1–13.5)
POTASSIUM SERPL-SCNC: 3.9 MMOL/L (ref 3.7–5.3)
PROTHROMBIN TIME: 11.7 SEC (ref 9.1–12.3)
RBC # BLD: 5.19 M/UL (ref 4.21–5.77)
RBC # BLD: ABNORMAL 10*6/UL
SEG NEUTROPHILS: 51 % (ref 36–65)
SEGMENTED NEUTROPHILS ABSOLUTE COUNT: 5.23 K/UL (ref 1.5–8.1)
SODIUM BLD-SCNC: 133 MMOL/L (ref 135–144)
WBC # BLD: 10.1 K/UL (ref 3.5–11.3)

## 2022-04-14 PROCEDURE — 2W6PX0Z TRACTION OF LEFT UPPER LEG USING TRACTION APPARATUS: ICD-10-PCS | Performed by: ORTHOPAEDIC SURGERY

## 2022-04-14 PROCEDURE — 80048 BASIC METABOLIC PNL TOTAL CA: CPT

## 2022-04-14 PROCEDURE — 85025 COMPLETE CBC W/AUTO DIFF WBC: CPT

## 2022-04-14 PROCEDURE — 73560 X-RAY EXAM OF KNEE 1 OR 2: CPT

## 2022-04-14 PROCEDURE — 73552 X-RAY EXAM OF FEMUR 2/>: CPT

## 2022-04-14 PROCEDURE — 96374 THER/PROPH/DIAG INJ IV PUSH: CPT

## 2022-04-14 PROCEDURE — 85730 THROMBOPLASTIN TIME PARTIAL: CPT

## 2022-04-14 PROCEDURE — 85610 PROTHROMBIN TIME: CPT

## 2022-04-14 PROCEDURE — 6360000002 HC RX W HCPCS

## 2022-04-14 PROCEDURE — 73502 X-RAY EXAM HIP UNI 2-3 VIEWS: CPT

## 2022-04-14 PROCEDURE — 99284 EMERGENCY DEPT VISIT MOD MDM: CPT

## 2022-04-14 PROCEDURE — 96376 TX/PRO/DX INJ SAME DRUG ADON: CPT

## 2022-04-14 PROCEDURE — 96375 TX/PRO/DX INJ NEW DRUG ADDON: CPT

## 2022-04-14 RX ORDER — FENTANYL CITRATE 50 UG/ML
50 INJECTION, SOLUTION INTRAMUSCULAR; INTRAVENOUS ONCE
Status: COMPLETED | OUTPATIENT
Start: 2022-04-14 | End: 2022-04-14

## 2022-04-14 RX ORDER — MORPHINE SULFATE 4 MG/ML
4 INJECTION, SOLUTION INTRAMUSCULAR; INTRAVENOUS ONCE
Status: COMPLETED | OUTPATIENT
Start: 2022-04-14 | End: 2022-04-14

## 2022-04-14 RX ORDER — FENTANYL CITRATE 50 UG/ML
100 INJECTION, SOLUTION INTRAMUSCULAR; INTRAVENOUS ONCE
Status: COMPLETED | OUTPATIENT
Start: 2022-04-14 | End: 2022-04-14

## 2022-04-14 RX ADMIN — FENTANYL CITRATE 50 MCG: 50 INJECTION, SOLUTION INTRAMUSCULAR; INTRAVENOUS at 23:10

## 2022-04-14 RX ADMIN — FENTANYL CITRATE 100 MCG: 50 INJECTION, SOLUTION INTRAMUSCULAR; INTRAVENOUS at 22:56

## 2022-04-14 RX ADMIN — MORPHINE SULFATE 4 MG: 4 INJECTION INTRAVENOUS at 23:27

## 2022-04-14 ASSESSMENT — PAIN SCALES - GENERAL
PAINLEVEL_OUTOF10: 10
PAINLEVEL_OUTOF10: 8

## 2022-04-15 ENCOUNTER — APPOINTMENT (OUTPATIENT)
Dept: GENERAL RADIOLOGY | Age: 37
DRG: 308 | End: 2022-04-15
Payer: COMMERCIAL

## 2022-04-15 ENCOUNTER — ANESTHESIA EVENT (OUTPATIENT)
Dept: OPERATING ROOM | Age: 37
DRG: 308 | End: 2022-04-15
Payer: COMMERCIAL

## 2022-04-15 ENCOUNTER — APPOINTMENT (OUTPATIENT)
Dept: CT IMAGING | Age: 37
DRG: 308 | End: 2022-04-15
Payer: COMMERCIAL

## 2022-04-15 ENCOUNTER — ANESTHESIA (OUTPATIENT)
Dept: OPERATING ROOM | Age: 37
DRG: 308 | End: 2022-04-15
Payer: COMMERCIAL

## 2022-04-15 VITALS — DIASTOLIC BLOOD PRESSURE: 75 MMHG | SYSTOLIC BLOOD PRESSURE: 129 MMHG | TEMPERATURE: 97.7 F | OXYGEN SATURATION: 100 %
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
## 2022-04-15 LAB
ABO/RH: NORMAL
ABSOLUTE EOS #: <0.03 K/UL (ref 0–0.44)
ABSOLUTE IMMATURE GRANULOCYTE: 0.07 K/UL (ref 0–0.3)
ABSOLUTE LYMPH #: 1.39 K/UL (ref 1.1–3.7)
ABSOLUTE MONO #: 0.29 K/UL (ref 0.1–1.2)
ANION GAP SERPL CALCULATED.3IONS-SCNC: 10 MMOL/L (ref 9–17)
ANTIBODY SCREEN: NEGATIVE
ARM BAND NUMBER: NORMAL
BASOPHILS # BLD: 0 % (ref 0–2)
BASOPHILS ABSOLUTE: <0.03 K/UL (ref 0–0.2)
BUN BLDV-MCNC: 14 MG/DL (ref 6–20)
CALCIUM SERPL-MCNC: 8 MG/DL (ref 8.6–10.4)
CHLORIDE BLD-SCNC: 100 MMOL/L (ref 98–107)
CO2: 24 MMOL/L (ref 20–31)
CREAT SERPL-MCNC: 0.68 MG/DL (ref 0.7–1.2)
EOSINOPHILS RELATIVE PERCENT: 0 % (ref 1–4)
EXPIRATION DATE: NORMAL
GFR AFRICAN AMERICAN: >60 ML/MIN
GFR NON-AFRICAN AMERICAN: >60 ML/MIN
GFR SERPL CREATININE-BSD FRML MDRD: ABNORMAL ML/MIN/{1.73_M2}
GLUCOSE BLD-MCNC: 173 MG/DL (ref 70–99)
HCT VFR BLD CALC: 30.1 % (ref 40.7–50.3)
HEMOGLOBIN: 9.4 G/DL (ref 13–17)
IMMATURE GRANULOCYTES: 1 %
LYMPHOCYTES # BLD: 14 % (ref 24–43)
MCH RBC QN AUTO: 25.8 PG (ref 25.2–33.5)
MCHC RBC AUTO-ENTMCNC: 31.2 G/DL (ref 28.4–34.8)
MCV RBC AUTO: 82.7 FL (ref 82.6–102.9)
MONOCYTES # BLD: 3 % (ref 3–12)
NRBC AUTOMATED: 0 PER 100 WBC
PDW BLD-RTO: 18.6 % (ref 11.8–14.4)
PLATELET # BLD: 158 K/UL (ref 138–453)
PMV BLD AUTO: 9.6 FL (ref 8.1–13.5)
POTASSIUM SERPL-SCNC: 4.6 MMOL/L (ref 3.7–5.3)
RBC # BLD: 3.64 M/UL (ref 4.21–5.77)
RBC # BLD: ABNORMAL 10*6/UL
SARS-COV-2, RAPID: NOT DETECTED
SEG NEUTROPHILS: 82 % (ref 36–65)
SEGMENTED NEUTROPHILS ABSOLUTE COUNT: 8.14 K/UL (ref 1.5–8.1)
SODIUM BLD-SCNC: 134 MMOL/L (ref 135–144)
SPECIMEN DESCRIPTION: NORMAL
VITAMIN D 25-HYDROXY: 7.7 NG/ML
WBC # BLD: 9.9 K/UL (ref 3.5–11.3)

## 2022-04-15 PROCEDURE — 6360000002 HC RX W HCPCS: Performed by: ANESTHESIOLOGY

## 2022-04-15 PROCEDURE — 7100000000 HC PACU RECOVERY - FIRST 15 MIN: Performed by: ORTHOPAEDIC SURGERY

## 2022-04-15 PROCEDURE — 2580000003 HC RX 258: Performed by: ANESTHESIOLOGY

## 2022-04-15 PROCEDURE — 73700 CT LOWER EXTREMITY W/O DYE: CPT

## 2022-04-15 PROCEDURE — 2580000003 HC RX 258: Performed by: STUDENT IN AN ORGANIZED HEALTH CARE EDUCATION/TRAINING PROGRAM

## 2022-04-15 PROCEDURE — 3E0T3BZ INTRODUCTION OF ANESTHETIC AGENT INTO PERIPHERAL NERVES AND PLEXI, PERCUTANEOUS APPROACH: ICD-10-PCS | Performed by: ANESTHESIOLOGY

## 2022-04-15 PROCEDURE — 71045 X-RAY EXAM CHEST 1 VIEW: CPT

## 2022-04-15 PROCEDURE — 0QS706Z REPOSITION LEFT UPPER FEMUR WITH INTRAMEDULLARY INTERNAL FIXATION DEVICE, OPEN APPROACH: ICD-10-PCS | Performed by: ORTHOPAEDIC SURGERY

## 2022-04-15 PROCEDURE — 6360000002 HC RX W HCPCS: Performed by: STUDENT IN AN ORGANIZED HEALTH CARE EDUCATION/TRAINING PROGRAM

## 2022-04-15 PROCEDURE — 76942 ECHO GUIDE FOR BIOPSY: CPT | Performed by: ANESTHESIOLOGY

## 2022-04-15 PROCEDURE — 1200000000 HC SEMI PRIVATE

## 2022-04-15 PROCEDURE — 6360000002 HC RX W HCPCS

## 2022-04-15 PROCEDURE — 73562 X-RAY EXAM OF KNEE 3: CPT

## 2022-04-15 PROCEDURE — 2720000010 HC SURG SUPPLY STERILE: Performed by: ORTHOPAEDIC SURGERY

## 2022-04-15 PROCEDURE — 99253 IP/OBS CNSLTJ NEW/EST LOW 45: CPT | Performed by: ORTHOPAEDIC SURGERY

## 2022-04-15 PROCEDURE — 27245 TREAT THIGH FRACTURE: CPT | Performed by: ORTHOPAEDIC SURGERY

## 2022-04-15 PROCEDURE — 73552 X-RAY EXAM OF FEMUR 2/>: CPT

## 2022-04-15 PROCEDURE — 3600000014 HC SURGERY LEVEL 4 ADDTL 15MIN: Performed by: ORTHOPAEDIC SURGERY

## 2022-04-15 PROCEDURE — 2580000003 HC RX 258: Performed by: ORTHOPAEDIC SURGERY

## 2022-04-15 PROCEDURE — 73502 X-RAY EXAM HIP UNI 2-3 VIEWS: CPT

## 2022-04-15 PROCEDURE — 2580000003 HC RX 258

## 2022-04-15 PROCEDURE — 6360000002 HC RX W HCPCS: Performed by: ORTHOPAEDIC SURGERY

## 2022-04-15 PROCEDURE — 6370000000 HC RX 637 (ALT 250 FOR IP): Performed by: NURSE PRACTITIONER

## 2022-04-15 PROCEDURE — 6370000000 HC RX 637 (ALT 250 FOR IP): Performed by: STUDENT IN AN ORGANIZED HEALTH CARE EDUCATION/TRAINING PROGRAM

## 2022-04-15 PROCEDURE — 86900 BLOOD TYPING SEROLOGIC ABO: CPT

## 2022-04-15 PROCEDURE — 80048 BASIC METABOLIC PNL TOTAL CA: CPT

## 2022-04-15 PROCEDURE — 3700000001 HC ADD 15 MINUTES (ANESTHESIA): Performed by: ORTHOPAEDIC SURGERY

## 2022-04-15 PROCEDURE — 82306 VITAMIN D 25 HYDROXY: CPT

## 2022-04-15 PROCEDURE — 3700000000 HC ANESTHESIA ATTENDED CARE: Performed by: ORTHOPAEDIC SURGERY

## 2022-04-15 PROCEDURE — C1713 ANCHOR/SCREW BN/BN,TIS/BN: HCPCS | Performed by: ORTHOPAEDIC SURGERY

## 2022-04-15 PROCEDURE — 3600000004 HC SURGERY LEVEL 4 BASE: Performed by: ORTHOPAEDIC SURGERY

## 2022-04-15 PROCEDURE — 85025 COMPLETE CBC W/AUTO DIFF WBC: CPT

## 2022-04-15 PROCEDURE — C1769 GUIDE WIRE: HCPCS | Performed by: ORTHOPAEDIC SURGERY

## 2022-04-15 PROCEDURE — 3209999900 FLUORO FOR SURGICAL PROCEDURES

## 2022-04-15 PROCEDURE — 86901 BLOOD TYPING SEROLOGIC RH(D): CPT

## 2022-04-15 PROCEDURE — 87635 SARS-COV-2 COVID-19 AMP PRB: CPT

## 2022-04-15 PROCEDURE — 2500000003 HC RX 250 WO HCPCS: Performed by: ANESTHESIOLOGY

## 2022-04-15 PROCEDURE — 70450 CT HEAD/BRAIN W/O DYE: CPT

## 2022-04-15 PROCEDURE — 36415 COLL VENOUS BLD VENIPUNCTURE: CPT

## 2022-04-15 PROCEDURE — 86850 RBC ANTIBODY SCREEN: CPT

## 2022-04-15 PROCEDURE — 2780000010 HC IMPLANT OTHER: Performed by: ORTHOPAEDIC SURGERY

## 2022-04-15 PROCEDURE — 2709999900 HC NON-CHARGEABLE SUPPLY: Performed by: ORTHOPAEDIC SURGERY

## 2022-04-15 PROCEDURE — 7100000001 HC PACU RECOVERY - ADDTL 15 MIN: Performed by: ORTHOPAEDIC SURGERY

## 2022-04-15 DEVICE — IMPLANTABLE DEVICE: Type: IMPLANTABLE DEVICE | Site: FEMUR | Status: FUNCTIONAL

## 2022-04-15 DEVICE — SCREW BNE L42MM DIA5MM NONSTERILE TIB LT GRN TI ST CANN LOK: Type: IMPLANTABLE DEVICE | Site: FEMUR | Status: FUNCTIONAL

## 2022-04-15 DEVICE — NAIL IM L400MM DIA11MM 130DEG LNG L PROX FEM GRN TI CANN: Type: IMPLANTABLE DEVICE | Site: FEMUR | Status: FUNCTIONAL

## 2022-04-15 DEVICE — SCREW BNE L48MM DIA5MM NONSTERILE TIB LT GRN TI ST CANN LOK: Type: IMPLANTABLE DEVICE | Site: FEMUR | Status: FUNCTIONAL

## 2022-04-15 RX ORDER — SODIUM CHLORIDE 0.9 % (FLUSH) 0.9 %
5-40 SYRINGE (ML) INJECTION PRN
Status: DISCONTINUED | OUTPATIENT
Start: 2022-04-15 | End: 2022-04-17 | Stop reason: HOSPADM

## 2022-04-15 RX ORDER — FENTANYL CITRATE 50 UG/ML
50 INJECTION, SOLUTION INTRAMUSCULAR; INTRAVENOUS ONCE
Status: COMPLETED | OUTPATIENT
Start: 2022-04-15 | End: 2022-04-15

## 2022-04-15 RX ORDER — FENTANYL CITRATE 50 UG/ML
50 INJECTION, SOLUTION INTRAMUSCULAR; INTRAVENOUS
Status: DISCONTINUED | OUTPATIENT
Start: 2022-04-15 | End: 2022-04-15 | Stop reason: HOSPADM

## 2022-04-15 RX ORDER — LIDOCAINE HYDROCHLORIDE 10 MG/ML
20 INJECTION, SOLUTION EPIDURAL; INFILTRATION; INTRACAUDAL; PERINEURAL ONCE
Status: DISCONTINUED | OUTPATIENT
Start: 2022-04-15 | End: 2022-04-15

## 2022-04-15 RX ORDER — VENLAFAXINE 75 MG/1
150 TABLET ORAL DAILY
Status: DISCONTINUED | OUTPATIENT
Start: 2022-04-15 | End: 2022-04-17 | Stop reason: HOSPADM

## 2022-04-15 RX ORDER — MORPHINE SULFATE 4 MG/ML
4 INJECTION, SOLUTION INTRAMUSCULAR; INTRAVENOUS ONCE
Status: COMPLETED | OUTPATIENT
Start: 2022-04-15 | End: 2022-04-15

## 2022-04-15 RX ORDER — BACLOFEN 10 MG/1
20 TABLET ORAL 3 TIMES DAILY
Status: DISCONTINUED | OUTPATIENT
Start: 2022-04-15 | End: 2022-04-15

## 2022-04-15 RX ORDER — FAMOTIDINE 20 MG/1
20 TABLET, FILM COATED ORAL ONCE
Status: DISCONTINUED | OUTPATIENT
Start: 2022-04-15 | End: 2022-04-15 | Stop reason: HOSPADM

## 2022-04-15 RX ORDER — METOCLOPRAMIDE HYDROCHLORIDE 5 MG/ML
10 INJECTION INTRAMUSCULAR; INTRAVENOUS
Status: DISCONTINUED | OUTPATIENT
Start: 2022-04-15 | End: 2022-04-15 | Stop reason: HOSPADM

## 2022-04-15 RX ORDER — ONDANSETRON 4 MG/1
4 TABLET, ORALLY DISINTEGRATING ORAL EVERY 8 HOURS PRN
Status: DISCONTINUED | OUTPATIENT
Start: 2022-04-15 | End: 2022-04-17 | Stop reason: HOSPADM

## 2022-04-15 RX ORDER — AMITRIPTYLINE HYDROCHLORIDE 100 MG/1
100 TABLET, FILM COATED ORAL NIGHTLY
COMMUNITY

## 2022-04-15 RX ORDER — SODIUM CHLORIDE 0.9 % (FLUSH) 0.9 %
5-40 SYRINGE (ML) INJECTION EVERY 12 HOURS SCHEDULED
Status: DISCONTINUED | OUTPATIENT
Start: 2022-04-15 | End: 2022-04-15 | Stop reason: HOSPADM

## 2022-04-15 RX ORDER — MIDAZOLAM HYDROCHLORIDE 1 MG/ML
INJECTION INTRAMUSCULAR; INTRAVENOUS PRN
Status: DISCONTINUED | OUTPATIENT
Start: 2022-04-15 | End: 2022-04-15 | Stop reason: SDUPTHER

## 2022-04-15 RX ORDER — OXYCODONE HYDROCHLORIDE 5 MG/1
5 TABLET ORAL EVERY 6 HOURS PRN
Status: DISCONTINUED | OUTPATIENT
Start: 2022-04-15 | End: 2022-04-17 | Stop reason: HOSPADM

## 2022-04-15 RX ORDER — POLYETHYLENE GLYCOL 3350 17 G/17G
17 POWDER, FOR SOLUTION ORAL DAILY
Status: DISCONTINUED | OUTPATIENT
Start: 2022-04-15 | End: 2022-04-17 | Stop reason: HOSPADM

## 2022-04-15 RX ORDER — AMOXICILLIN AND CLAVULANATE POTASSIUM 875; 125 MG/1; MG/1
1 TABLET, FILM COATED ORAL 2 TIMES DAILY
COMMUNITY

## 2022-04-15 RX ORDER — DEXAMETHASONE SODIUM PHOSPHATE 10 MG/ML
INJECTION INTRAMUSCULAR; INTRAVENOUS PRN
Status: DISCONTINUED | OUTPATIENT
Start: 2022-04-15 | End: 2022-04-15 | Stop reason: SDUPTHER

## 2022-04-15 RX ORDER — SODIUM CHLORIDE, SODIUM LACTATE, POTASSIUM CHLORIDE, CALCIUM CHLORIDE 600; 310; 30; 20 MG/100ML; MG/100ML; MG/100ML; MG/100ML
INJECTION, SOLUTION INTRAVENOUS CONTINUOUS
Status: DISCONTINUED | OUTPATIENT
Start: 2022-04-15 | End: 2022-04-15

## 2022-04-15 RX ORDER — IBUPROFEN 400 MG/1
400 TABLET ORAL EVERY 4 HOURS
Status: DISCONTINUED | OUTPATIENT
Start: 2022-04-15 | End: 2022-04-16

## 2022-04-15 RX ORDER — SENNA AND DOCUSATE SODIUM 50; 8.6 MG/1; MG/1
1 TABLET, FILM COATED ORAL 2 TIMES DAILY
Status: DISCONTINUED | OUTPATIENT
Start: 2022-04-15 | End: 2022-04-17 | Stop reason: HOSPADM

## 2022-04-15 RX ORDER — VASOPRESSIN 20 [USP'U]/ML
INJECTION, SOLUTION INTRAMUSCULAR; SUBCUTANEOUS PRN
Status: DISCONTINUED | OUTPATIENT
Start: 2022-04-15 | End: 2022-04-15 | Stop reason: SDUPTHER

## 2022-04-15 RX ORDER — MAGNESIUM HYDROXIDE 1200 MG/15ML
LIQUID ORAL PRN
Status: DISCONTINUED | OUTPATIENT
Start: 2022-04-15 | End: 2022-04-15 | Stop reason: ALTCHOICE

## 2022-04-15 RX ORDER — ACETAMINOPHEN 500 MG
1000 TABLET ORAL EVERY 8 HOURS SCHEDULED
Status: DISCONTINUED | OUTPATIENT
Start: 2022-04-15 | End: 2022-04-15

## 2022-04-15 RX ORDER — AMITRIPTYLINE HYDROCHLORIDE 50 MG/1
100 TABLET, FILM COATED ORAL NIGHTLY
Status: DISCONTINUED | OUTPATIENT
Start: 2022-04-15 | End: 2022-04-17 | Stop reason: HOSPADM

## 2022-04-15 RX ORDER — SODIUM CHLORIDE 9 MG/ML
INJECTION, SOLUTION INTRAVENOUS PRN
Status: DISCONTINUED | OUTPATIENT
Start: 2022-04-15 | End: 2022-04-17 | Stop reason: HOSPADM

## 2022-04-15 RX ORDER — KETAMINE HCL IN NACL, ISO-OSM 100MG/10ML
SYRINGE (ML) INJECTION PRN
Status: DISCONTINUED | OUTPATIENT
Start: 2022-04-15 | End: 2022-04-15 | Stop reason: SDUPTHER

## 2022-04-15 RX ORDER — MAGNESIUM HYDROXIDE 1200 MG/15ML
LIQUID ORAL CONTINUOUS PRN
Status: COMPLETED | OUTPATIENT
Start: 2022-04-15 | End: 2022-04-15

## 2022-04-15 RX ORDER — ERGOCALCIFEROL 1.25 MG/1
50000 CAPSULE ORAL WEEKLY
Qty: 8 CAPSULE | Refills: 0 | Status: SHIPPED | OUTPATIENT
Start: 2022-04-15 | End: 2022-04-15 | Stop reason: SDUPTHER

## 2022-04-15 RX ORDER — GABAPENTIN 600 MG/1
600 TABLET ORAL EVERY 8 HOURS
Status: DISCONTINUED | OUTPATIENT
Start: 2022-04-15 | End: 2022-04-17 | Stop reason: HOSPADM

## 2022-04-15 RX ORDER — VENLAFAXINE 75 MG/1
150 TABLET ORAL DAILY
COMMUNITY

## 2022-04-15 RX ORDER — PROPOFOL 10 MG/ML
INJECTION, EMULSION INTRAVENOUS DAILY PRN
Status: COMPLETED | OUTPATIENT
Start: 2022-04-15 | End: 2022-04-15

## 2022-04-15 RX ORDER — GABAPENTIN 300 MG/1
300 CAPSULE ORAL ONCE
Status: DISCONTINUED | OUTPATIENT
Start: 2022-04-15 | End: 2022-04-15 | Stop reason: HOSPADM

## 2022-04-15 RX ORDER — SODIUM CHLORIDE, SODIUM LACTATE, POTASSIUM CHLORIDE, CALCIUM CHLORIDE 600; 310; 30; 20 MG/100ML; MG/100ML; MG/100ML; MG/100ML
INJECTION, SOLUTION INTRAVENOUS CONTINUOUS PRN
Status: DISCONTINUED | OUTPATIENT
Start: 2022-04-15 | End: 2022-04-15 | Stop reason: SDUPTHER

## 2022-04-15 RX ORDER — FENTANYL CITRATE 50 UG/ML
25 INJECTION, SOLUTION INTRAMUSCULAR; INTRAVENOUS EVERY 5 MIN PRN
Status: DISCONTINUED | OUTPATIENT
Start: 2022-04-15 | End: 2022-04-15 | Stop reason: HOSPADM

## 2022-04-15 RX ORDER — 0.9 % SODIUM CHLORIDE 0.9 %
1000 INTRAVENOUS SOLUTION INTRAVENOUS ONCE
Status: COMPLETED | OUTPATIENT
Start: 2022-04-15 | End: 2022-04-15

## 2022-04-15 RX ORDER — LIDOCAINE HYDROCHLORIDE 10 MG/ML
INJECTION, SOLUTION EPIDURAL; INFILTRATION; INTRACAUDAL; PERINEURAL PRN
Status: DISCONTINUED | OUTPATIENT
Start: 2022-04-15 | End: 2022-04-15 | Stop reason: SDUPTHER

## 2022-04-15 RX ORDER — FENTANYL CITRATE 50 UG/ML
25 INJECTION, SOLUTION INTRAMUSCULAR; INTRAVENOUS
Status: DISCONTINUED | OUTPATIENT
Start: 2022-04-15 | End: 2022-04-15 | Stop reason: HOSPADM

## 2022-04-15 RX ORDER — ERGOCALCIFEROL 1.25 MG/1
50000 CAPSULE ORAL WEEKLY
Qty: 8 CAPSULE | Refills: 0 | Status: SHIPPED | OUTPATIENT
Start: 2022-04-15 | End: 2022-04-17 | Stop reason: SDUPTHER

## 2022-04-15 RX ORDER — SODIUM CHLORIDE 9 MG/ML
25 INJECTION, SOLUTION INTRAVENOUS PRN
Status: DISCONTINUED | OUTPATIENT
Start: 2022-04-15 | End: 2022-04-15 | Stop reason: HOSPADM

## 2022-04-15 RX ORDER — BACLOFEN 10 MG/1
20 TABLET ORAL 3 TIMES DAILY
Status: DISCONTINUED | OUTPATIENT
Start: 2022-04-15 | End: 2022-04-17 | Stop reason: HOSPADM

## 2022-04-15 RX ORDER — GABAPENTIN 800 MG/1
800 TABLET ORAL 2 TIMES DAILY
Status: DISCONTINUED | OUTPATIENT
Start: 2022-04-15 | End: 2022-04-15

## 2022-04-15 RX ORDER — ONDANSETRON 2 MG/ML
INJECTION INTRAMUSCULAR; INTRAVENOUS PRN
Status: DISCONTINUED | OUTPATIENT
Start: 2022-04-15 | End: 2022-04-15 | Stop reason: SDUPTHER

## 2022-04-15 RX ORDER — FENTANYL CITRATE 50 UG/ML
INJECTION, SOLUTION INTRAMUSCULAR; INTRAVENOUS PRN
Status: DISCONTINUED | OUTPATIENT
Start: 2022-04-15 | End: 2022-04-15 | Stop reason: SDUPTHER

## 2022-04-15 RX ORDER — PROPOFOL 10 MG/ML
40 INJECTION, EMULSION INTRAVENOUS CONTINUOUS
Status: DISCONTINUED | OUTPATIENT
Start: 2022-04-15 | End: 2022-04-15

## 2022-04-15 RX ORDER — OXYCODONE HYDROCHLORIDE 5 MG/1
5 TABLET ORAL
Status: DISCONTINUED | OUTPATIENT
Start: 2022-04-15 | End: 2022-04-15 | Stop reason: HOSPADM

## 2022-04-15 RX ORDER — PHENYLEPHRINE HCL IN 0.9% NACL 1 MG/10 ML
SYRINGE (ML) INTRAVENOUS PRN
Status: DISCONTINUED | OUTPATIENT
Start: 2022-04-15 | End: 2022-04-15 | Stop reason: SDUPTHER

## 2022-04-15 RX ORDER — PROPOFOL 10 MG/ML
INJECTION, EMULSION INTRAVENOUS PRN
Status: DISCONTINUED | OUTPATIENT
Start: 2022-04-15 | End: 2022-04-15 | Stop reason: SDUPTHER

## 2022-04-15 RX ORDER — ONDANSETRON 2 MG/ML
4 INJECTION INTRAMUSCULAR; INTRAVENOUS EVERY 6 HOURS PRN
Status: DISCONTINUED | OUTPATIENT
Start: 2022-04-15 | End: 2022-04-17 | Stop reason: HOSPADM

## 2022-04-15 RX ORDER — MIDAZOLAM HYDROCHLORIDE 2 MG/2ML
2 INJECTION, SOLUTION INTRAMUSCULAR; INTRAVENOUS
Status: DISCONTINUED | OUTPATIENT
Start: 2022-04-15 | End: 2022-04-15 | Stop reason: HOSPADM

## 2022-04-15 RX ORDER — SODIUM CHLORIDE 0.9 % (FLUSH) 0.9 %
5-40 SYRINGE (ML) INJECTION EVERY 12 HOURS SCHEDULED
Status: DISCONTINUED | OUTPATIENT
Start: 2022-04-15 | End: 2022-04-17 | Stop reason: HOSPADM

## 2022-04-15 RX ORDER — DIMENHYDRINATE 50 MG/1
50 TABLET ORAL ONCE
Status: DISCONTINUED | OUTPATIENT
Start: 2022-04-15 | End: 2022-04-15 | Stop reason: HOSPADM

## 2022-04-15 RX ORDER — SODIUM CHLORIDE 0.9 % (FLUSH) 0.9 %
5-40 SYRINGE (ML) INJECTION PRN
Status: DISCONTINUED | OUTPATIENT
Start: 2022-04-15 | End: 2022-04-15 | Stop reason: HOSPADM

## 2022-04-15 RX ORDER — FENTANYL CITRATE 50 UG/ML
100 INJECTION, SOLUTION INTRAMUSCULAR; INTRAVENOUS ONCE
Status: COMPLETED | OUTPATIENT
Start: 2022-04-15 | End: 2022-04-15

## 2022-04-15 RX ORDER — ROCURONIUM BROMIDE 10 MG/ML
INJECTION, SOLUTION INTRAVENOUS PRN
Status: DISCONTINUED | OUTPATIENT
Start: 2022-04-15 | End: 2022-04-15 | Stop reason: SDUPTHER

## 2022-04-15 RX ORDER — ROPIVACAINE HYDROCHLORIDE 5 MG/ML
INJECTION, SOLUTION EPIDURAL; INFILTRATION; PERINEURAL
Status: COMPLETED | OUTPATIENT
Start: 2022-04-15 | End: 2022-04-15

## 2022-04-15 RX ADMIN — Medication 100 MCG: at 09:00

## 2022-04-15 RX ADMIN — PROPOFOL 40 MG: 10 INJECTION, EMULSION INTRAVENOUS at 01:50

## 2022-04-15 RX ADMIN — IBUPROFEN 400 MG: 400 TABLET, FILM COATED ORAL at 22:08

## 2022-04-15 RX ADMIN — Medication 50 MCG: at 02:09

## 2022-04-15 RX ADMIN — PROPOFOL 20 MG: 10 INJECTION, EMULSION INTRAVENOUS at 01:58

## 2022-04-15 RX ADMIN — FENTANYL CITRATE 50 MCG: 50 INJECTION, SOLUTION INTRAMUSCULAR; INTRAVENOUS at 01:05

## 2022-04-15 RX ADMIN — SODIUM CHLORIDE, POTASSIUM CHLORIDE, SODIUM LACTATE AND CALCIUM CHLORIDE: 600; 310; 30; 20 INJECTION, SOLUTION INTRAVENOUS at 10:06

## 2022-04-15 RX ADMIN — PROPOFOL 20 MG: 10 INJECTION, EMULSION INTRAVENOUS at 02:24

## 2022-04-15 RX ADMIN — Medication 100 MCG: at 09:32

## 2022-04-15 RX ADMIN — SODIUM CHLORIDE, POTASSIUM CHLORIDE, SODIUM LACTATE AND CALCIUM CHLORIDE: 600; 310; 30; 20 INJECTION, SOLUTION INTRAVENOUS at 09:05

## 2022-04-15 RX ADMIN — PROPOFOL 20 MG: 10 INJECTION, EMULSION INTRAVENOUS at 02:01

## 2022-04-15 RX ADMIN — PROPOFOL 20 MG: 10 INJECTION, EMULSION INTRAVENOUS at 01:53

## 2022-04-15 RX ADMIN — ROCURONIUM BROMIDE 50 MG: 10 INJECTION INTRAVENOUS at 09:27

## 2022-04-15 RX ADMIN — PROPOFOL 20 MG: 10 INJECTION, EMULSION INTRAVENOUS at 01:52

## 2022-04-15 RX ADMIN — SUGAMMADEX 200 MG: 100 INJECTION, SOLUTION INTRAVENOUS at 12:17

## 2022-04-15 RX ADMIN — FENTANYL CITRATE 50 MCG: 50 INJECTION, SOLUTION INTRAMUSCULAR; INTRAVENOUS at 02:41

## 2022-04-15 RX ADMIN — HYDROMORPHONE HYDROCHLORIDE 0.5 MG: 1 INJECTION, SOLUTION INTRAMUSCULAR; INTRAVENOUS; SUBCUTANEOUS at 12:24

## 2022-04-15 RX ADMIN — BACLOFEN 20 MG: 10 TABLET ORAL at 13:20

## 2022-04-15 RX ADMIN — FENTANYL CITRATE 50 MCG: 50 INJECTION, SOLUTION INTRAMUSCULAR; INTRAVENOUS at 10:11

## 2022-04-15 RX ADMIN — PROPOFOL 20 MG: 10 INJECTION, EMULSION INTRAVENOUS at 01:55

## 2022-04-15 RX ADMIN — DOCUSATE SODIUM 50 MG AND SENNOSIDES 8.6 MG 1 TABLET: 8.6; 5 TABLET, FILM COATED ORAL at 22:17

## 2022-04-15 RX ADMIN — ROCURONIUM BROMIDE 20 MG: 10 INJECTION INTRAVENOUS at 09:57

## 2022-04-15 RX ADMIN — PROPOFOL 20 MG: 10 INJECTION, EMULSION INTRAVENOUS at 01:56

## 2022-04-15 RX ADMIN — WATER 2000 MG: 1 INJECTION INTRAMUSCULAR; INTRAVENOUS; SUBCUTANEOUS at 17:21

## 2022-04-15 RX ADMIN — SODIUM CHLORIDE, PRESERVATIVE FREE 10 ML: 5 INJECTION INTRAVENOUS at 22:11

## 2022-04-15 RX ADMIN — VASOPRESSIN 2 UNITS: 20 INJECTION INTRAVENOUS at 09:12

## 2022-04-15 RX ADMIN — HYDROMORPHONE HYDROCHLORIDE 0.5 MG: 1 INJECTION, SOLUTION INTRAMUSCULAR; INTRAVENOUS; SUBCUTANEOUS at 12:34

## 2022-04-15 RX ADMIN — VASOPRESSIN 2 UNITS: 20 INJECTION INTRAVENOUS at 09:05

## 2022-04-15 RX ADMIN — PROPOFOL 20 MG: 10 INJECTION, EMULSION INTRAVENOUS at 02:14

## 2022-04-15 RX ADMIN — ROPIVACAINE HYDROCHLORIDE 40 ML: 5 INJECTION, SOLUTION EPIDURAL; INFILTRATION; PERINEURAL at 08:58

## 2022-04-15 RX ADMIN — Medication 200 MCG: at 09:02

## 2022-04-15 RX ADMIN — IBUPROFEN 400 MG: 400 TABLET, FILM COATED ORAL at 16:15

## 2022-04-15 RX ADMIN — ROCURONIUM BROMIDE 20 MG: 10 INJECTION INTRAVENOUS at 10:05

## 2022-04-15 RX ADMIN — SODIUM CHLORIDE, POTASSIUM CHLORIDE, SODIUM LACTATE AND CALCIUM CHLORIDE: 600; 310; 30; 20 INJECTION, SOLUTION INTRAVENOUS at 13:16

## 2022-04-15 RX ADMIN — SODIUM CHLORIDE, POTASSIUM CHLORIDE, SODIUM LACTATE AND CALCIUM CHLORIDE: 600; 310; 30; 20 INJECTION, SOLUTION INTRAVENOUS at 11:26

## 2022-04-15 RX ADMIN — PROPOFOL 20 MG: 10 INJECTION, EMULSION INTRAVENOUS at 01:57

## 2022-04-15 RX ADMIN — HYDROMORPHONE HYDROCHLORIDE 0.5 MG: 1 INJECTION, SOLUTION INTRAMUSCULAR; INTRAVENOUS; SUBCUTANEOUS at 12:20

## 2022-04-15 RX ADMIN — Medication 100 MCG: at 09:05

## 2022-04-15 RX ADMIN — VENLAFAXINE 150 MG: 75 TABLET ORAL at 16:15

## 2022-04-15 RX ADMIN — MIDAZOLAM HYDROCHLORIDE 2 MG: 1 INJECTION, SOLUTION INTRAMUSCULAR; INTRAVENOUS at 08:45

## 2022-04-15 RX ADMIN — SODIUM CHLORIDE 1000 ML: 9 INJECTION, SOLUTION INTRAVENOUS at 01:43

## 2022-04-15 RX ADMIN — PROPOFOL 20 MG: 10 INJECTION, EMULSION INTRAVENOUS at 02:00

## 2022-04-15 RX ADMIN — PHENYLEPHRINE HYDROCHLORIDE 100 MCG/MIN: 10 INJECTION INTRAVENOUS at 09:33

## 2022-04-15 RX ADMIN — ONDANSETRON 4 MG: 2 INJECTION INTRAMUSCULAR; INTRAVENOUS at 12:00

## 2022-04-15 RX ADMIN — ROCURONIUM BROMIDE 20 MG: 10 INJECTION INTRAVENOUS at 11:04

## 2022-04-15 RX ADMIN — MORPHINE SULFATE 4 MG: 4 INJECTION INTRAVENOUS at 03:02

## 2022-04-15 RX ADMIN — ROCURONIUM BROMIDE 10 MG: 10 INJECTION INTRAVENOUS at 10:36

## 2022-04-15 RX ADMIN — HYDROMORPHONE HYDROCHLORIDE 0.5 MG: 1 INJECTION, SOLUTION INTRAMUSCULAR; INTRAVENOUS; SUBCUTANEOUS at 12:40

## 2022-04-15 RX ADMIN — OXYCODONE 5 MG: 5 TABLET ORAL at 22:09

## 2022-04-15 RX ADMIN — Medication 100 MCG: at 08:57

## 2022-04-15 RX ADMIN — OXYCODONE 5 MG: 5 TABLET ORAL at 13:21

## 2022-04-15 RX ADMIN — SODIUM CHLORIDE, POTASSIUM CHLORIDE, SODIUM LACTATE AND CALCIUM CHLORIDE: 600; 310; 30; 20 INJECTION, SOLUTION INTRAVENOUS at 03:34

## 2022-04-15 RX ADMIN — FENTANYL CITRATE 150 MCG: 50 INJECTION, SOLUTION INTRAMUSCULAR; INTRAVENOUS at 08:48

## 2022-04-15 RX ADMIN — PROPOFOL 20 MG: 10 INJECTION, EMULSION INTRAVENOUS at 02:21

## 2022-04-15 RX ADMIN — Medication 50 MCG: at 02:00

## 2022-04-15 RX ADMIN — PROPOFOL 20 MG: 10 INJECTION, EMULSION INTRAVENOUS at 02:05

## 2022-04-15 RX ADMIN — Medication 200 MCG: at 09:06

## 2022-04-15 RX ADMIN — WATER 2000 MG: 1 INJECTION INTRAMUSCULAR; INTRAVENOUS; SUBCUTANEOUS at 09:41

## 2022-04-15 RX ADMIN — AMITRIPTYLINE HYDROCHLORIDE 100 MG: 50 TABLET, FILM COATED ORAL at 22:09

## 2022-04-15 RX ADMIN — GABAPENTIN 600 MG: 600 TABLET ORAL at 22:09

## 2022-04-15 RX ADMIN — PROPOFOL 200 MG: 10 INJECTION, EMULSION INTRAVENOUS at 08:48

## 2022-04-15 RX ADMIN — PROPOFOL 40 MG: 10 INJECTION, EMULSION INTRAVENOUS at 02:08

## 2022-04-15 RX ADMIN — PROPOFOL 20 MG: 10 INJECTION, EMULSION INTRAVENOUS at 02:16

## 2022-04-15 RX ADMIN — FENTANYL CITRATE 50 MCG: 50 INJECTION, SOLUTION INTRAMUSCULAR; INTRAVENOUS at 00:59

## 2022-04-15 RX ADMIN — ROCURONIUM BROMIDE 10 MG: 10 INJECTION INTRAVENOUS at 11:16

## 2022-04-15 RX ADMIN — FENTANYL CITRATE 50 MCG: 50 INJECTION, SOLUTION INTRAMUSCULAR; INTRAVENOUS at 11:07

## 2022-04-15 RX ADMIN — PROPOFOL 20 MG: 10 INJECTION, EMULSION INTRAVENOUS at 01:51

## 2022-04-15 RX ADMIN — DEXAMETHASONE SODIUM PHOSPHATE 10 MG: 10 INJECTION INTRAMUSCULAR; INTRAVENOUS at 09:40

## 2022-04-15 RX ADMIN — GABAPENTIN 800 MG: 800 TABLET ORAL at 13:20

## 2022-04-15 RX ADMIN — OXYCODONE 5 MG: 5 TABLET ORAL at 03:49

## 2022-04-15 RX ADMIN — BACLOFEN 20 MG: 10 TABLET ORAL at 22:16

## 2022-04-15 RX ADMIN — Medication 50 MCG: at 02:30

## 2022-04-15 RX ADMIN — LIDOCAINE HYDROCHLORIDE 50 MG: 10 INJECTION, SOLUTION EPIDURAL; INFILTRATION; INTRACAUDAL; PERINEURAL at 08:48

## 2022-04-15 RX ADMIN — PROPOFOL 20 MG: 10 INJECTION, EMULSION INTRAVENOUS at 02:27

## 2022-04-15 RX ADMIN — PROPOFOL 20 MG: 10 INJECTION, EMULSION INTRAVENOUS at 01:59

## 2022-04-15 RX ADMIN — ROCURONIUM BROMIDE 50 MG: 10 INJECTION INTRAVENOUS at 08:48

## 2022-04-15 RX ADMIN — PROPOFOL 20 MG: 10 INJECTION, EMULSION INTRAVENOUS at 02:09

## 2022-04-15 RX ADMIN — PROPOFOL 20 MG: 10 INJECTION, EMULSION INTRAVENOUS at 02:20

## 2022-04-15 RX ADMIN — BACLOFEN 20 MG: 10 TABLET ORAL at 06:30

## 2022-04-15 RX ADMIN — Medication 50 MG: at 08:48

## 2022-04-15 RX ADMIN — FENTANYL CITRATE 100 MCG: 50 INJECTION, SOLUTION INTRAMUSCULAR; INTRAVENOUS at 02:40

## 2022-04-15 RX ADMIN — PROTHROMBIN, COAGULATION FACTOR VII HUMAN, COAGULATION FACTOR IX HUMAN, COAGULATION FACTOR X HUMAN, PROTEIN C, PROTEIN S HUMAN, AND WATER 2000 UNITS: KIT at 09:48

## 2022-04-15 ASSESSMENT — PAIN SCALES - GENERAL
PAINLEVEL_OUTOF10: 8
PAINLEVEL_OUTOF10: 8
PAINLEVEL_OUTOF10: 7
PAINLEVEL_OUTOF10: 9
PAINLEVEL_OUTOF10: 9
PAINLEVEL_OUTOF10: 8
PAINLEVEL_OUTOF10: 10
PAINLEVEL_OUTOF10: 8
PAINLEVEL_OUTOF10: 9
PAINLEVEL_OUTOF10: 8
PAINLEVEL_OUTOF10: 10
PAINLEVEL_OUTOF10: 8
PAINLEVEL_OUTOF10: 10

## 2022-04-15 ASSESSMENT — ENCOUNTER SYMPTOMS
BACK PAIN: 0
RHINORRHEA: 0
ABDOMINAL PAIN: 0
PHOTOPHOBIA: 0
NAUSEA: 0
VOMITING: 0
SHORTNESS OF BREATH: 0
WHEEZING: 0

## 2022-04-15 ASSESSMENT — PULMONARY FUNCTION TESTS
PIF_VALUE: 25
PIF_VALUE: 25
PIF_VALUE: 23
PIF_VALUE: 24
PIF_VALUE: 23
PIF_VALUE: 22
PIF_VALUE: 23
PIF_VALUE: 22
PIF_VALUE: 1
PIF_VALUE: 23
PIF_VALUE: 26
PIF_VALUE: 25
PIF_VALUE: 23
PIF_VALUE: 25
PIF_VALUE: 24
PIF_VALUE: 23
PIF_VALUE: 23
PIF_VALUE: 28
PIF_VALUE: 6
PIF_VALUE: 24
PIF_VALUE: 1
PIF_VALUE: 23
PIF_VALUE: 25
PIF_VALUE: 25
PIF_VALUE: 21
PIF_VALUE: 25
PIF_VALUE: 26
PIF_VALUE: 26
PIF_VALUE: 25
PIF_VALUE: 21
PIF_VALUE: 25
PIF_VALUE: 27
PIF_VALUE: 23
PIF_VALUE: 28
PIF_VALUE: 26
PIF_VALUE: 24
PIF_VALUE: 23
PIF_VALUE: 19
PIF_VALUE: 25
PIF_VALUE: 23
PIF_VALUE: 23
PIF_VALUE: 24
PIF_VALUE: 26
PIF_VALUE: 21
PIF_VALUE: 25
PIF_VALUE: 26
PIF_VALUE: 23
PIF_VALUE: 23
PIF_VALUE: 25
PIF_VALUE: 21
PIF_VALUE: 23
PIF_VALUE: 16
PIF_VALUE: 23
PIF_VALUE: 24
PIF_VALUE: 2
PIF_VALUE: 22
PIF_VALUE: 26
PIF_VALUE: 23
PIF_VALUE: 24
PIF_VALUE: 21
PIF_VALUE: 9
PIF_VALUE: 18
PIF_VALUE: 23
PIF_VALUE: 24
PIF_VALUE: 26
PIF_VALUE: 22
PIF_VALUE: 22
PIF_VALUE: 24
PIF_VALUE: 23
PIF_VALUE: 1
PIF_VALUE: 24
PIF_VALUE: 22
PIF_VALUE: 24
PIF_VALUE: 22
PIF_VALUE: 23
PIF_VALUE: 21
PIF_VALUE: 25
PIF_VALUE: 3
PIF_VALUE: 23
PIF_VALUE: 21
PIF_VALUE: 24
PIF_VALUE: 23
PIF_VALUE: 21
PIF_VALUE: 27
PIF_VALUE: 23
PIF_VALUE: 21
PIF_VALUE: 23
PIF_VALUE: 25
PIF_VALUE: 23
PIF_VALUE: 24
PIF_VALUE: 25
PIF_VALUE: 24
PIF_VALUE: 23
PIF_VALUE: 25
PIF_VALUE: 25
PIF_VALUE: 24
PIF_VALUE: 25
PIF_VALUE: 26
PIF_VALUE: 26
PIF_VALUE: 22
PIF_VALUE: 26
PIF_VALUE: 24
PIF_VALUE: 23
PIF_VALUE: 25
PIF_VALUE: 23
PIF_VALUE: 24
PIF_VALUE: 25
PIF_VALUE: 27
PIF_VALUE: 22
PIF_VALUE: 23
PIF_VALUE: 24
PIF_VALUE: 23
PIF_VALUE: 25
PIF_VALUE: 19
PIF_VALUE: 27
PIF_VALUE: 26
PIF_VALUE: 23
PIF_VALUE: 26
PIF_VALUE: 23
PIF_VALUE: 26
PIF_VALUE: 24
PIF_VALUE: 24
PIF_VALUE: 1
PIF_VALUE: 23
PIF_VALUE: 24
PIF_VALUE: 24
PIF_VALUE: 21
PIF_VALUE: 38
PIF_VALUE: 23
PIF_VALUE: 23
PIF_VALUE: 4
PIF_VALUE: 21
PIF_VALUE: 23
PIF_VALUE: 25
PIF_VALUE: 23
PIF_VALUE: 2
PIF_VALUE: 24
PIF_VALUE: 24
PIF_VALUE: 27
PIF_VALUE: 24
PIF_VALUE: 23
PIF_VALUE: 23
PIF_VALUE: 26
PIF_VALUE: 24
PIF_VALUE: 1
PIF_VALUE: 23
PIF_VALUE: 24
PIF_VALUE: 28
PIF_VALUE: 23
PIF_VALUE: 25
PIF_VALUE: 24
PIF_VALUE: 24
PIF_VALUE: 26
PIF_VALUE: 23
PIF_VALUE: 24
PIF_VALUE: 19
PIF_VALUE: 23
PIF_VALUE: 25
PIF_VALUE: 25
PIF_VALUE: 23
PIF_VALUE: 21
PIF_VALUE: 24
PIF_VALUE: 23
PIF_VALUE: 27
PIF_VALUE: 23
PIF_VALUE: 24
PIF_VALUE: 0
PIF_VALUE: 20
PIF_VALUE: 22
PIF_VALUE: 19
PIF_VALUE: 2
PIF_VALUE: 23
PIF_VALUE: 23
PIF_VALUE: 21
PIF_VALUE: 17
PIF_VALUE: 19
PIF_VALUE: 24
PIF_VALUE: 23
PIF_VALUE: 25
PIF_VALUE: 24
PIF_VALUE: 19
PIF_VALUE: 23
PIF_VALUE: 21
PIF_VALUE: 20
PIF_VALUE: 20
PIF_VALUE: 25
PIF_VALUE: 23
PIF_VALUE: 24
PIF_VALUE: 0
PIF_VALUE: 23
PIF_VALUE: 24
PIF_VALUE: 25
PIF_VALUE: 25
PIF_VALUE: 21
PIF_VALUE: 27
PIF_VALUE: 25
PIF_VALUE: 22
PIF_VALUE: 24
PIF_VALUE: 22
PIF_VALUE: 24
PIF_VALUE: 25
PIF_VALUE: 19
PIF_VALUE: 23
PIF_VALUE: 24
PIF_VALUE: 24
PIF_VALUE: 23
PIF_VALUE: 20
PIF_VALUE: 24
PIF_VALUE: 23
PIF_VALUE: 28

## 2022-04-15 ASSESSMENT — PAIN DESCRIPTION - DESCRIPTORS: DESCRIPTORS: THROBBING

## 2022-04-15 ASSESSMENT — PAIN DESCRIPTION - FREQUENCY: FREQUENCY: INTERMITTENT

## 2022-04-15 ASSESSMENT — PAIN DESCRIPTION - ONSET: ONSET: ON-GOING

## 2022-04-15 ASSESSMENT — PAIN DESCRIPTION - ORIENTATION: ORIENTATION: LEFT;ANTERIOR

## 2022-04-15 ASSESSMENT — PAIN - FUNCTIONAL ASSESSMENT: PAIN_FUNCTIONAL_ASSESSMENT: 0-10

## 2022-04-15 ASSESSMENT — PAIN DESCRIPTION - PAIN TYPE: TYPE: SURGICAL PAIN

## 2022-04-15 ASSESSMENT — PAIN DESCRIPTION - LOCATION: LOCATION: LEG

## 2022-04-15 NOTE — ANESTHESIA POSTPROCEDURE EVALUATION
Department of Anesthesiology  Postprocedure Note    Patient: Izaiah Roberson  MRN: [id number]  [place]: 1985  Date of evaluation: 4/15/2022  Time:  2:44 PM     Procedure Summary     Date: 04/15/22 Room / Location: [address]    Anesthesia Start: 8575 Anesthesia Stop: 7657    Procedure: REMOVAL OF SKELETAL TRACTION , LEFT FEMUR IMN, SYNTHES TFNA  NAIL, C-ARM (Left ) Diagnosis: (LEFT FEMUR FX)    Surgeons: Kate Gallegos MD Responsible Provider: Haider Hull MD    Anesthesia Type: general ASA Status: 4          Anesthesia Type: general    Kaylee Phase I: Kaylee Score: 9    Kaylee Phase II:      Last vitals: Reviewed and per EMR flowsheets.        Anesthesia Post Evaluation    Patient location during evaluation: PACU  Patient participation: complete - patient participated  Level of consciousness: awake and alert  Airway patency: patent  Nausea & Vomiting: no nausea and no vomiting  Complications: no  Cardiovascular status: blood pressure returned to baseline  Respiratory status: acceptable  Hydration status: euvolemic  Comments: No known anesthesia related complication  Excellent analgesia with PNB  Multimodal analgesia pain management approach

## 2022-04-15 NOTE — CARE COORDINATION
SBIRT-  Met with pt this date states he has been incarcerated  since Dec 2021. Pt denies any drug or alcohol use. Pt denies having any feelings of depression, however has made suicidal comments while in detention and has swallowed a razor in the past. Presently taking elavil. Pt plans to follow up with a [facility] after his release from detention. Alcohol Screening and Brief Intervention        No results for input(s): ALC in the last 72 hours. Alcohol Pre-screening  (MEN ONLY) How many times in the past year have you had 5 or more drinks in a day?: None       Alcohol Screening Audit       Drug Pre-Screening   How many times in the past year have you used a recreational drug or used a prescription medication for nonmedical reasons?: None    Drug Screening DAST       Mood Pre-Screening (PHQ-2)  During the past two weeks, have you been bothered by little interest or pleasure in doing things?: No  During the past two weeks, have you been bothered by feeling down, depressed, or hopeless?: No    Mood Pre-Screening (PHQ-9)         I have interviewed Ruben Paulino, [phone] regarding  His alcohol consumption/drug use and risk for excessive use. Screenings were negative. Patient  N/A intervention at this time pt is presently incarcerated. .     Deferred []    Completed on: 4/15/2022   [address] [facility]
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
## 2022-04-15 NOTE — PROCEDURES
PROCEDURE SEDATION NOTE    PATIENT NAME: Duyen Obrien RECORD NO. [id number]  DATE: 4/15/2022  ATTENDING PHYSICIAN:     PREOPERATIVE DIAGNOSIS:  fracture dislocation  POSTOPERATIVE DIAGNOSIS:  Same  PROCEDURE PERFORMED:   Procedural Sedation  PERFORMING PHYSICIAN: Chalino Marcus MD.  The attending physician was present and supervising this procedure. DISCUSSION:  Jackie Tapia is a 39y.o.-year-old male who requires procedural sedation for intertrochanteric fracture L leg, placement in traction. The history and physical examination were reviewed and confirmed. CONSENT: I have discussed with the patient and/or the patient representative the indication, alternatives, and the possible risks and/or complications of the planned procedure and the anesthesia methods. The patient and/or patient representative appear to understand and agree to proceed. PRE-SEDATION DOCUMENTATION AND EXAM: I have personally completed a history, physical exam & review of systems for this patient (see notes). AIRWAY ASSESSMENT: Mallampati Class I - (soft palate, fauces, uvula & anterior/posterior tonsillar pillars are visible)    PRIOR HISTORY OF ANESTHESIA COMPLICATIONS: none    ASA CLASSIFICATION: Class 2 - A normal healthy patient with mild systemic disease    SEDATION/ANESTHESIA PLAN: intravenous sedation    MEDICATIONS USED: propofol intravenously    MONITORING AND SAFETY: The patient was placed on a cardiac monitor and vital signs, pulse oximetry and level of consciousness were continuously evaluated throughout the procedure. The patient was closely monitored until recovery from the medications was complete and the patient had returned to baseline status. Respiratory therapy was on standby at all times during the procedure.     (The following sections must be completed)  POST-SEDATION VITAL SIGNS: Vital signs were reviewed and were stable after the procedure (see flow sheet for vitals) POST-SEDATION EXAM: Lungs: clear and Cardiovascular: normal    COMPLICATIONS: none     Evelin Marie MD  2:38 AM, 4/15/22
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
## 2022-04-15 NOTE — BRIEF OP NOTE
Brief Postoperative Note      Patient: Abram Garg  YOB: 1985  MRN: [id number]    Date of Procedure: 4/15/2022    Pre-Op Diagnosis: Left comminuted subtrochanteric femur fracture    Post-Op Diagnosis: Left comminuted subtrochanteric femur fracture       Procedure(s): Left femur open reduction intramedullary nail insertion    Surgeon(s): Chantal Regalado MD    Assistant: Resident: Rachel Varghese DO; Margarito Daly DO; Sayda Barber DO    Anesthesia: General    Estimated Blood Loss (mL): 150 mL    Fluids: 2100 Crystalloids    Complications: None    Specimens: * No specimens in log *    Implants:  Implant Name Type Inv.  Item Serial No.  Lot No. LRB No. Used Action   NAIL IM L400MM [id number] 130DEG LNG L PROX FEM GRN TI PREMA - [id number]  NAIL IM L400MM [id number] 130DEG LNG L PROX FEM GRN TI [employer]WD [id number] Left 1 Implanted   BLADE IM L95MM DIA10.35MM PROX FEM G TI [employer] FEN CARLOS FOR - [id number]  BLADE IM L95MM DIA10.35MM PROX FEM G TI [employer] FEN CARLOS FOR  [facility] [employer]-WD [id number] Left 1 Implanted         Drains: * No LDAs found *    Findings: Left subtrochanteric comminuted femur fracture    Electronically signed by Rachel Varghese DO on 4/15/2022 at 11:52 AM
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
## 2022-04-15 NOTE — ANESTHESIA PROCEDURE NOTES
Peripheral Block    Patient location during procedure: OR  Start time: 4/15/2022 8:55 AM  End time: 4/15/2022 9:01 AM  Staffing  Performed: anesthesiologist   Anesthesiologist: Jaya Hammond MD  Preanesthetic Checklist  Completed: patient identified, IV checked, site marked, risks and benefits discussed, surgical consent, monitors and equipment checked, pre-op evaluation, timeout performed, anesthesia consent given, oxygen available and patient being monitored  Peripheral Block  Patient position: supine  Prep: ChloraPrep  Patient monitoring: cardiac monitor, continuous pulse ox, continuous capnometry, frequent blood pressure checks and IV access  Block type: Fascia iliaca  Laterality: left  Injection technique: single-shot  Guidance: ultrasound guided  Provider prep: mask and sterile gloves  Needle  Needle type: short-bevel   Needle gauge: 20 G  Needle length: 15 cm  Needle localization: ultrasound guidance  Assessment  Injection assessment: negative aspiration for heme and local visualized surrounding nerve on ultrasound  Slow fractionated injection: yes  Hemodynamics: stable  Additional Notes  Uneventful- atraumatic - fascia iliaca block  0.5% Ropivacaine 40 mls. Diluted to 60 mls of of 0.35% with sterile saline.   Incremental and fractionated injections around the femoral nerve  ,   and  lateral femoral cutaneous nerve at the groin   and in the  and in the supra-inguinal  fascia iliaca / psoas plane  No EKG changes        Medications Administered  Ropivacaine (NAROPIN) injection 0.5%, 40 mL  Reason for block: post-op pain management and at surgeon's request

## 2022-04-16 PROCEDURE — 1200000000 HC SEMI PRIVATE

## 2022-04-16 PROCEDURE — 6360000002 HC RX W HCPCS: Performed by: STUDENT IN AN ORGANIZED HEALTH CARE EDUCATION/TRAINING PROGRAM

## 2022-04-16 PROCEDURE — 2500000003 HC RX 250 WO HCPCS: Performed by: STUDENT IN AN ORGANIZED HEALTH CARE EDUCATION/TRAINING PROGRAM

## 2022-04-16 PROCEDURE — 6370000000 HC RX 637 (ALT 250 FOR IP): Performed by: STUDENT IN AN ORGANIZED HEALTH CARE EDUCATION/TRAINING PROGRAM

## 2022-04-16 PROCEDURE — 6370000000 HC RX 637 (ALT 250 FOR IP): Performed by: NURSE PRACTITIONER

## 2022-04-16 PROCEDURE — 97530 THERAPEUTIC ACTIVITIES: CPT

## 2022-04-16 PROCEDURE — 94761 N-INVAS EAR/PLS OXIMETRY MLT: CPT

## 2022-04-16 PROCEDURE — 97116 GAIT TRAINING THERAPY: CPT

## 2022-04-16 PROCEDURE — 97166 OT EVAL MOD COMPLEX 45 MIN: CPT

## 2022-04-16 PROCEDURE — 2580000003 HC RX 258: Performed by: STUDENT IN AN ORGANIZED HEALTH CARE EDUCATION/TRAINING PROGRAM

## 2022-04-16 PROCEDURE — 97162 PT EVAL MOD COMPLEX 30 MIN: CPT

## 2022-04-16 PROCEDURE — 97535 SELF CARE MNGMENT TRAINING: CPT

## 2022-04-16 RX ORDER — KETOROLAC TROMETHAMINE 15 MG/ML
15 INJECTION, SOLUTION INTRAMUSCULAR; INTRAVENOUS EVERY 6 HOURS
Status: DISCONTINUED | OUTPATIENT
Start: 2022-04-16 | End: 2022-04-17 | Stop reason: HOSPADM

## 2022-04-16 RX ADMIN — AMITRIPTYLINE HYDROCHLORIDE 100 MG: 50 TABLET, FILM COATED ORAL at 20:52

## 2022-04-16 RX ADMIN — POLYETHYLENE GLYCOL 3350 17 G: 17 POWDER, FOR SOLUTION ORAL at 09:59

## 2022-04-16 RX ADMIN — BACLOFEN 20 MG: 10 TABLET ORAL at 09:59

## 2022-04-16 RX ADMIN — SODIUM CHLORIDE, PRESERVATIVE FREE 10 ML: 5 INJECTION INTRAVENOUS at 20:53

## 2022-04-16 RX ADMIN — WATER 2000 MG: 1 INJECTION INTRAMUSCULAR; INTRAVENOUS; SUBCUTANEOUS at 11:35

## 2022-04-16 RX ADMIN — APIXABAN 5 MG: 5 TABLET, FILM COATED ORAL at 11:29

## 2022-04-16 RX ADMIN — OXYCODONE 5 MG: 5 TABLET ORAL at 09:59

## 2022-04-16 RX ADMIN — VENLAFAXINE 150 MG: 75 TABLET ORAL at 15:11

## 2022-04-16 RX ADMIN — IBUPROFEN 400 MG: 400 TABLET, FILM COATED ORAL at 03:24

## 2022-04-16 RX ADMIN — GABAPENTIN 600 MG: 600 TABLET ORAL at 23:19

## 2022-04-16 RX ADMIN — BACLOFEN 20 MG: 10 TABLET ORAL at 20:52

## 2022-04-16 RX ADMIN — BACLOFEN 20 MG: 10 TABLET ORAL at 15:11

## 2022-04-16 RX ADMIN — KETOROLAC TROMETHAMINE 15 MG: 15 INJECTION, SOLUTION INTRAMUSCULAR; INTRAVENOUS at 11:32

## 2022-04-16 RX ADMIN — DOCUSATE SODIUM 50 MG AND SENNOSIDES 8.6 MG 1 TABLET: 8.6; 5 TABLET, FILM COATED ORAL at 09:59

## 2022-04-16 RX ADMIN — WATER 2000 MG: 1 INJECTION INTRAMUSCULAR; INTRAVENOUS; SUBCUTANEOUS at 00:13

## 2022-04-16 RX ADMIN — DOCUSATE SODIUM 50 MG AND SENNOSIDES 8.6 MG 1 TABLET: 8.6; 5 TABLET, FILM COATED ORAL at 20:52

## 2022-04-16 RX ADMIN — OXYCODONE 5 MG: 5 TABLET ORAL at 22:13

## 2022-04-16 RX ADMIN — IBUPROFEN 400 MG: 400 TABLET, FILM COATED ORAL at 09:59

## 2022-04-16 RX ADMIN — OXYCODONE 5 MG: 5 TABLET ORAL at 03:24

## 2022-04-16 RX ADMIN — OXYCODONE 5 MG: 5 TABLET ORAL at 15:59

## 2022-04-16 RX ADMIN — APIXABAN 5 MG: 5 TABLET, FILM COATED ORAL at 20:52

## 2022-04-16 RX ADMIN — GABAPENTIN 600 MG: 600 TABLET ORAL at 09:59

## 2022-04-16 RX ADMIN — KETOROLAC TROMETHAMINE 15 MG: 15 INJECTION, SOLUTION INTRAMUSCULAR; INTRAVENOUS at 23:45

## 2022-04-16 ASSESSMENT — PAIN SCALES - GENERAL
PAINLEVEL_OUTOF10: 7
PAINLEVEL_OUTOF10: 8
PAINLEVEL_OUTOF10: 10
PAINLEVEL_OUTOF10: 7
PAINLEVEL_OUTOF10: 7

## 2022-04-16 ASSESSMENT — PAIN DESCRIPTION - LOCATION
LOCATION: LEG
LOCATION: LEG

## 2022-04-16 ASSESSMENT — PAIN - FUNCTIONAL ASSESSMENT: PAIN_FUNCTIONAL_ASSESSMENT: PREVENTS OR INTERFERES SOME ACTIVE ACTIVITIES AND ADLS

## 2022-04-16 ASSESSMENT — PAIN DESCRIPTION - ORIENTATION
ORIENTATION: LEFT
ORIENTATION: LEFT

## 2022-04-16 ASSESSMENT — PAIN DESCRIPTION - PAIN TYPE
TYPE: SURGICAL PAIN
TYPE: SURGICAL PAIN

## 2022-04-16 ASSESSMENT — PAIN DESCRIPTION - DESCRIPTORS
DESCRIPTORS: THROBBING
DESCRIPTORS: THROBBING;ACHING

## 2022-04-16 ASSESSMENT — PAIN DESCRIPTION - FREQUENCY
FREQUENCY: INTERMITTENT
FREQUENCY: INTERMITTENT

## 2022-04-16 ASSESSMENT — PAIN DESCRIPTION - PROGRESSION: CLINICAL_PROGRESSION: GRADUALLY WORSENING
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
## 2022-04-16 NOTE — PLAN OF CARE
Problem: Pain:  Goal: Pain level will decrease  Description: Pain level will decrease  4/16/2022 1809 by Agata Lopez RN  Outcome: Ongoing  4/16/2022 0521 by Andie Paula RN  Outcome: Ongoing  Goal: Control of acute pain  Description: Control of acute pain  4/16/2022 1809 by Agata Lopez RN  Outcome: Ongoing  4/16/2022 0521 by Andie Paula RN  Outcome: Ongoing  Goal: Control of chronic pain  Description: Control of chronic pain  4/16/2022 1809 by Agata Lopez RN  Outcome: Ongoing  4/16/2022 0521 by Andie Paula RN  Outcome: Ongoing     Problem: Falls - Risk of:  Goal: Will remain free from falls  Description: Will remain free from falls  4/16/2022 1809 by Agata Loepz RN  Outcome: Ongoing  4/16/2022 0521 by Andie Paula RN  Outcome: Ongoing  Goal: Absence of physical injury  Description: Absence of physical injury  4/16/2022 1809 by Agata Lopez RN  Outcome: Ongoing  4/16/2022 0521 by Andie Paula RN  Outcome: Ongoing
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
## 2022-04-17 VITALS
HEIGHT: 68 IN | BODY MASS INDEX: 30.31 KG/M2 | HEART RATE: 115 BPM | TEMPERATURE: 99.3 F | DIASTOLIC BLOOD PRESSURE: 71 MMHG | WEIGHT: 200 LBS | OXYGEN SATURATION: 95 % | RESPIRATION RATE: 16 BRPM | SYSTOLIC BLOOD PRESSURE: 109 MMHG

## 2022-04-17 PROBLEM — S72.142A CLOSED INTERTROCHANTERIC FRACTURE OF LEFT FEMUR, INITIAL ENCOUNTER (HCC): Status: ACTIVE | Noted: 2022-04-17

## 2022-04-17 PROCEDURE — 6370000000 HC RX 637 (ALT 250 FOR IP): Performed by: STUDENT IN AN ORGANIZED HEALTH CARE EDUCATION/TRAINING PROGRAM

## 2022-04-17 PROCEDURE — 97110 THERAPEUTIC EXERCISES: CPT

## 2022-04-17 PROCEDURE — 6370000000 HC RX 637 (ALT 250 FOR IP): Performed by: NURSE PRACTITIONER

## 2022-04-17 PROCEDURE — 6360000002 HC RX W HCPCS: Performed by: STUDENT IN AN ORGANIZED HEALTH CARE EDUCATION/TRAINING PROGRAM

## 2022-04-17 PROCEDURE — 97116 GAIT TRAINING THERAPY: CPT

## 2022-04-17 PROCEDURE — 97530 THERAPEUTIC ACTIVITIES: CPT

## 2022-04-17 PROCEDURE — 2580000003 HC RX 258: Performed by: STUDENT IN AN ORGANIZED HEALTH CARE EDUCATION/TRAINING PROGRAM

## 2022-04-17 RX ORDER — GABAPENTIN 600 MG/1
600 TABLET ORAL EVERY 8 HOURS
Qty: 90 TABLET | Refills: 0 | Status: SHIPPED | OUTPATIENT
Start: 2022-04-17 | End: 2022-05-17

## 2022-04-17 RX ORDER — ERGOCALCIFEROL 1.25 MG/1
50000 CAPSULE ORAL WEEKLY
Qty: 8 CAPSULE | Refills: 0 | Status: SHIPPED | OUTPATIENT
Start: 2022-04-17 | End: 2022-06-06

## 2022-04-17 RX ORDER — OXYCODONE HYDROCHLORIDE 5 MG/1
5 TABLET ORAL EVERY 6 HOURS PRN
Qty: 20 TABLET | Refills: 0 | Status: SHIPPED | OUTPATIENT
Start: 2022-04-17 | End: 2022-04-22

## 2022-04-17 RX ADMIN — SODIUM CHLORIDE, PRESERVATIVE FREE 10 ML: 5 INJECTION INTRAVENOUS at 08:06

## 2022-04-17 RX ADMIN — VENLAFAXINE 150 MG: 75 TABLET ORAL at 13:43

## 2022-04-17 RX ADMIN — POLYETHYLENE GLYCOL 3350 17 G: 17 POWDER, FOR SOLUTION ORAL at 08:07

## 2022-04-17 RX ADMIN — OXYCODONE 5 MG: 5 TABLET ORAL at 11:05

## 2022-04-17 RX ADMIN — OXYCODONE 5 MG: 5 TABLET ORAL at 05:46

## 2022-04-17 RX ADMIN — KETOROLAC TROMETHAMINE 15 MG: 15 INJECTION, SOLUTION INTRAMUSCULAR; INTRAVENOUS at 17:46

## 2022-04-17 RX ADMIN — BACLOFEN 20 MG: 10 TABLET ORAL at 13:43

## 2022-04-17 RX ADMIN — GABAPENTIN 600 MG: 600 TABLET ORAL at 08:07

## 2022-04-17 RX ADMIN — DOCUSATE SODIUM 50 MG AND SENNOSIDES 8.6 MG 1 TABLET: 8.6; 5 TABLET, FILM COATED ORAL at 08:07

## 2022-04-17 RX ADMIN — OXYCODONE 5 MG: 5 TABLET ORAL at 17:35

## 2022-04-17 RX ADMIN — GABAPENTIN 600 MG: 600 TABLET ORAL at 15:46

## 2022-04-17 RX ADMIN — KETOROLAC TROMETHAMINE 15 MG: 15 INJECTION, SOLUTION INTRAMUSCULAR; INTRAVENOUS at 11:31

## 2022-04-17 RX ADMIN — BACLOFEN 20 MG: 10 TABLET ORAL at 08:06

## 2022-04-17 RX ADMIN — APIXABAN 5 MG: 5 TABLET, FILM COATED ORAL at 08:07

## 2022-04-17 ASSESSMENT — PAIN SCALES - GENERAL
PAINLEVEL_OUTOF10: 9
PAINLEVEL_OUTOF10: 8
PAINLEVEL_OUTOF10: 9
PAINLEVEL_OUTOF10: 8
PAINLEVEL_OUTOF10: 9
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
## 2022-04-17 NOTE — PLAN OF CARE
Problem: Pain:  Goal: Pain level will decrease  Description: Pain level will decrease  4/17/2022 1715 by Pamela Verduzco RN  Outcome: Ongoing  4/17/2022 0424 by Suzie Cabrera RN  Outcome: Ongoing  Goal: Control of acute pain  Description: Control of acute pain  4/17/2022 1715 by Pamela Verduzco RN  Outcome: Ongoing  4/17/2022 0424 by Suzie Cabrera RN  Outcome: Ongoing  Goal: Control of chronic pain  Description: Control of chronic pain  4/17/2022 1715 by Pamela Verduzco RN  Outcome: Ongoing  4/17/2022 0424 by Suzie Cabrera RN  Outcome: Ongoing     Problem: Falls - Risk of:  Goal: Will remain free from falls  Description: Will remain free from falls  4/17/2022 1715 by Pamela Verduzco RN  Outcome: Ongoing  4/17/2022 0424 by Suzie Cabrera RN  Outcome: Met This Shift  Goal: Absence of physical injury  Description: Absence of physical injury  4/17/2022 1715 by Pamela Verduzco RN  Outcome: Ongoing  4/17/2022 0424 by Suzie Cabrera RN  Outcome: Met This Shift

## 2022-04-17 NOTE — PLAN OF CARE
Problem: Pain:  Goal: Pain level will decrease  Description: Pain level will decrease  4/17/2022 0424 by Cheikh Obregon RN  Outcome: Ongoing  4/16/2022 1809 by Shelly Barba RN  Outcome: Ongoing  Goal: Control of acute pain  Description: Control of acute pain  4/17/2022 0424 by Cheikh Obregon RN  Outcome: Ongoing  4/16/2022 1809 by Shelly Barba RN  Outcome: Ongoing  Goal: Control of chronic pain  Description: Control of chronic pain  4/17/2022 0424 by Cheikh Obregon RN  Outcome: Ongoing  4/16/2022 1809 by Shelly Barba RN  Outcome: Ongoing     Problem: Falls - Risk of:  Goal: Will remain free from falls  Description: Will remain free from falls  4/17/2022 0424 by Cheikh Obregon RN  Outcome: Met This Shift  4/16/2022 1809 by Shelly Barba RN  Outcome: Ongoing  Goal: Absence of physical injury  Description: Absence of physical injury  4/17/2022 0424 by Cheikh Obregon RN  Outcome: Met This Shift  4/16/2022 1809 by Shelly Barba RN  Outcome: Ongoing

## 2022-04-17 NOTE — PLAN OF CARE
Problem: Pain:  Goal: Pain level will decrease  Description: Pain level will decrease  4/17/2022 1835 by Orpha Kanner, RN  Outcome: Met This Shift  4/17/2022 1715 by Orpha Kanner, RN  Outcome: Ongoing  Goal: Control of acute pain  Description: Control of acute pain  4/17/2022 1835 by Orpha Kanner, RN  Outcome: Met This Shift  4/17/2022 1715 by Orpha Kanner, RN  Outcome: Ongoing  Goal: Control of chronic pain  Description: Control of chronic pain  4/17/2022 1835 by Orpha Kanner, RN  Outcome: Met This Shift  4/17/2022 1715 by Orpha Kanner, RN  Outcome: Ongoing     Problem: Falls - Risk of:  Goal: Will remain free from falls  Description: Will remain free from falls  4/17/2022 1835 by Orpha Kanner, RN  Outcome: Met This Shift  4/17/2022 1715 by Orpha Kanner, RN  Outcome: Ongoing  Goal: Absence of physical injury  Description: Absence of physical injury  4/17/2022 1835 by Orpha Kanner, RN  Outcome: Met This Shift  4/17/2022 1715 by Orpha Kanner, RN  Outcome: Ongoing
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
## 2022-04-17 NOTE — PROGRESS NOTES
Abram Garg was evaluated today and a DME order was entered for a wheeled walker because he requires this to successfully complete daily living tasks of ambulating. A wheeled walker is necessary due to the patient's unsteady gait, upper body weakness, and inability to  an ambulation device; and he can ambulate only by pushing a walker instead of a lesser assistive device such as a cane, crutch, or standard walker. The need for this equipment was discussed with the patient and he understands and is in agreement.
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
## 2022-04-18 NOTE — OP NOTE
Operative Note         Patient: Taylor Lombardi  YOB: 1985  MRN: [id number]     Date of Procedure: 4/15/2022     Pre-Op Diagnosis: Left comminuted subtrochanteric femur fracture     Post-Op Diagnosis: Left comminuted subtrochanteric femur fracture       Procedure(s): Left femur open reduction intramedullary nail insertion     Surgeon(s): Kory Wilikns MD     Assistant: Resident: Omayra Alba DO; Tonya Montana DO; Jamal Bird, [address] [place] [address]     Anesthesia: General     Estimated Blood Loss (mL): 150 mL     Fluids: 2100 Crystalloids     Complications: None     Specimens: * No specimens in log *     Implants:  Implant Name Type Inv. Item Serial No.  Lot No. LRB No. Used Action   NAIL IM L400MM [id number] 130DEG LNG L PROX FEM GRN TI PREMA - [id number]   NAIL IM L400MM [id number] 130DEG LNG L PROX FEM GRN TI PREMA   [employer] [id number] Left 1 Implanted   BLADE IM L95MM DIA10.35MM PROX FEM G TI PREMA FEN CARLOS FOR - [id number]   BLADE IM L95MM DIA10.35MM PROX FEM G TI PREMA FEN CARLOS FOR   [employer] [id number] Left 1 Implanted          Drains: * No LDAs found *     Findings: Left subtrochanteric comminuted femur fracture    Detailed Description of Procedure: On the day of surgery patient was met in the pre-operative unit where written consent was obtained and the operative site was marked with permanent marker. The patient was then wheeled back to the operative suite. General anesthesia was induced and the patient underwent endotracheal intubation. The operative foot was placed into a well-padded foot hamm and the patient was moved over to the fracture table. A well padded perineal post was put into the place and the non-operative leg was placed into a well-padded well-leg hamm. Appropriate antibiotics were being infused at this time. A timeout was held, and after all members were in agreement we proceeded forward with surgery.     At this time we utilized the fracture table to perform a reduction of the fracture and confirmed this under fluoroscopy. Once we felt we had obtained improved alignment we moved forward, but recognized that an open reduction was necessary to obtain an anatomic reduction. We draped and prepped the fashion in standard sterile fashion. A anterior incision was made overlying the fracture side and a lateral incision was made over the fracture site and a bone hook with a ball spike was used to manipulate the fracture to an anatomic position. We utilized c arm to find our optimal starting point on the greater trochanter. The starting guide wire was inserted percutaneously through the skin and advanced into our starting point. Once we felt we had the appropriate starting point a #10 blade was used to make an incision around the guide pin, approximately 5 cm in length. At this time the guide pin was advanced. When we felt we were in the appropriate place on both AP and lateral we utilized the opening reamer to gain entry to the femoral canal. At this time all instruments were removed and the ball tipped guidewire was inserted and advanced across the fracture site. Anatomic reduction was maintained and confirmed on fluoroscopy. Once the ball tipped guide wire was seated in the distal femoral physeal scar we measured the approximate length to be used for the nail. We then began reaming. Starting with an 8mm reamer we gradually reamed up to a reamer sized 1.5mm greater than our nail would be. At this time the reamers were removed and we inserted our nail. We confirmed that the nail was appropriately seated on c-arm and began preparing to insert the helical blade. Trochars were put onto the guide arm. Guide pin was drilled center-center in the femoral neck into the head. We measured the length of our blade and used a drill to open the lateral cortex. The helical blade was then inserted and statically locked proximally.  At this time all instrumentation was removed and we moved distally to insert our interlocking screws. Utilizing perfect circles on c-arm we inserted one interlocking screw distally. At this time we obtained final images confirming a stable anatomic reduction. The wounds were thoroughly irrigated. IT band was closed with 1 vicryl. Deep tissue closed with 0 vicryl. Subcutaneous tissue closed with 2-0 vicryl and the skin was closed with staples. Sterile xeroform, 4x4s, and tegaderm were applied as dressing. Patient was moved back over to the hospital bed. Anesthesia was reversed and patient was extubated without complications. At this time the patient was wheeled back to the recovery unit in stable condition.     Dr. Frederick Guevara was present for and active in all critical aspects of the case    Electronically signed by Sonido Lynch DO on 4/18/2022 at 1:38 AM
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
## 2022-04-27 ENCOUNTER — OFFICE VISIT (OUTPATIENT)
Dept: ORTHOPEDIC SURGERY | Age: 37
End: 2022-04-27

## 2022-04-27 ENCOUNTER — TELEPHONE (OUTPATIENT)
Dept: ORTHOPEDIC SURGERY | Age: 37
End: 2022-04-27

## 2022-04-27 VITALS — BODY MASS INDEX: 30.31 KG/M2 | WEIGHT: 200 LBS | HEIGHT: 68 IN

## 2022-04-27 DIAGNOSIS — M79.605 PAIN OF LEFT LOWER EXTREMITY: Primary | ICD-10-CM

## 2022-04-27 PROCEDURE — 99024 POSTOP FOLLOW-UP VISIT: CPT | Performed by: ORTHOPAEDIC SURGERY

## 2022-04-27 RX ORDER — IBUPROFEN 800 MG/1
800 TABLET ORAL 2 TIMES DAILY PRN
Qty: 180 TABLET | Refills: 1 | Status: SHIPPED | OUTPATIENT
Start: 2022-04-27
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
## 2022-04-27 NOTE — TELEPHONE ENCOUNTER
Navin Murrieta with [employer] is asking we fax over todays appt notes.      Fax. [phone]

## 2022-04-27 NOTE — PROGRESS NOTES
Chief Complaint   Patient presents with    Post-Op Check     04/15/2022 - removal of skeletal traction left femur IMN

## 2022-05-04 ENCOUNTER — HOSPITAL ENCOUNTER (OUTPATIENT)
Dept: PHYSICAL THERAPY | Age: 37
Setting detail: THERAPIES SERIES
Discharge: HOME | End: 2022-05-04
Payer: COMMERCIAL

## 2022-05-04 NOTE — FLOWSHEET NOTE
[x] [address]  [address]    [phone]  F: [phone]          Physical Therapy Cancel/No Show note    Date: 2022  Patient: Barb Gonzalez  : 1985  MRN: [id number]    Cancels/No Shows to date:     For today's appointment patient:    [x]  Cancelled    [] Rescheduled appointment    [] No-show     Reason given by patient:    []  Patient ill    []  Conflicting appointment    [] No transportation      [] Conflict with work    [] No reason given    [] Weather related    [] COVID-19    [] Other:      Comments:    Pt cancelled for initial evaluation for Physical Therapy for femur fracture, correctional facility staff reports pt refused      [] Next appointment was confirmed    Electronically signed by: Al Toledo, Student Physical Therapist

## 2022-05-10 ENCOUNTER — HOSPITAL ENCOUNTER (OUTPATIENT)
Dept: PHYSICAL THERAPY | Age: 37
Setting detail: THERAPIES SERIES
Discharge: HOME | End: 2022-05-10
Payer: COMMERCIAL

## 2022-05-10 PROCEDURE — 97110 THERAPEUTIC EXERCISES: CPT

## 2022-05-10 PROCEDURE — 97161 PT EVAL LOW COMPLEX 20 MIN: CPT
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
## 2022-05-18 ENCOUNTER — HOSPITAL ENCOUNTER (OUTPATIENT)
Dept: PHYSICAL THERAPY | Age: 37
Setting detail: THERAPIES SERIES
Discharge: HOME | End: 2022-05-18
Payer: COMMERCIAL

## 2022-05-18 PROCEDURE — 97110 THERAPEUTIC EXERCISES: CPT

## 2022-05-18 NOTE — FLOWSHEET NOTE
[x] [facility]) [facility]  Therapy  [address]  [phone]  F: [phone] [] [address]  [address]   [address]  P: [phone]  F: [phone] [] [address]  [address]  P: [phone]  F: [phone] [] [address]  P: [phone]  F: [phone] [] [address]  [facility]   [address]   [place]: [phone]  F: [phone]      Physical Therapy Daily Treatment Note    Date:  2022  Patient Name:  Rusty Pérez    :  1985  MRN: [id number]  Physician: Felton Moreno MD/ Justin Freeman MD                        [facility]   Medical Diagnosis: Closed intertrochanteric fracture of left femur, initial encounter [facility]) (S72.142A [ICD-10-CM])          Rehab Codes: M25.552, M25.562, M25.652, M25.662  Onset Date: 22                         Next 's appt: 22  Visit# / total visits: 2/12     Cancels/No Shows: 0/0    Subjective:    Pain:  [x] Yes  [] No Location: Left distal thigh  Pain Rating: (0-10 scale) 4/10  Pain altered Tx:  [] No  [] Yes  Action:  Comments:Patient still reports distal thigh pain and hip stiffness. Patient reports semi compliance with HEP. Patient reports he will be out of correction next month.   Patient has court .     Objective:  Modalities:   Precautions:  Exercises:  Exercise Reps/ Time Weight/ Level Comments   Seated       LAQ 20x     HS curls  20x      Calf raise  20x     Heel slides  20x     Hip abd  20x3\"           Supine       Quad sets  10x     SLR 2x10x     Knee chest AROM  15x     Clam shells  15x lime    Bent knee fall outs  15x5\"      Bridges  10x           Standing       3 way hip 10x ea     Marching       HS curl Other:      Treatment Charges: Mins Units   []  Modalities     [x]  Ther Exercise 45 3   []  Manual Therapy     []  Ther Activities     []  Aquatics     []  Vasocompression     []  Other     Total Treatment time 45 3       Assessment: [x] Progressing toward goals. Patient demonstrates improved L hip and knee ROM this date compared to intital evaluation. Left Quadriceps and glutes are still very weak. [] No change. [] Other:  [x] Patient will benefit form physical therapy to improve L hip and knee ROM, strength, and functional mobility. STG/LTG  STG: (to be met in 8 treatments)  1. ? Pain:3/10 L hip and knee with standing activity. 2. ? ROM: left knee 0-120 degrees to normalize gait. 3. ? Strength: 4/5 L hip flexion and knee extension to improve functional mobility. 4. ? Function: Patient is able to ambulate without an assistive device. 5. Independent with Home Exercise Programs     LTG: (to be met in 12 treatments)  1. Patient is able to climb stairs without functional deficit. 2. Patient is able to perform L SLS for 20 seconds. Pt. Education:  [x] Yes  [] No  [x] Reviewed Prior HEP/Ed  Method of Education: [x] Verbal  [x] Demo  [x] Written  Comprehension of Education:  [x] Verbalizes understanding. [x] Demonstrates understanding. [x] Needs review. [] Demonstrates/verbalizes HEP/Ed previously given. Plan: [x] Continue current frequency toward long and short term goals.     [x] Specific Instructions for subsequent treatments: add standing marching and HS curls        Time AQ:3859           Time Out: 0900    Electronically signed by:  Obi Bloom PT

## 2022-05-24 ENCOUNTER — HOSPITAL ENCOUNTER (OUTPATIENT)
Dept: PHYSICAL THERAPY | Age: 37
Setting detail: THERAPIES SERIES
Discharge: HOME | End: 2022-05-24
Payer: COMMERCIAL

## 2022-05-24 DIAGNOSIS — M79.605 PAIN OF LEFT LOWER EXTREMITY: Primary | ICD-10-CM

## 2022-05-24 PROCEDURE — 97110 THERAPEUTIC EXERCISES: CPT

## 2022-05-25 ENCOUNTER — OFFICE VISIT (OUTPATIENT)
Dept: ORTHOPEDIC SURGERY | Age: 37
End: 2022-05-25

## 2022-05-25 VITALS — BODY MASS INDEX: 30.31 KG/M2 | WEIGHT: 200 LBS | HEIGHT: 68 IN

## 2022-05-25 DIAGNOSIS — S72.042D CLOSED DISPLACED BASICERVICAL FRACTURE OF LEFT FEMUR WITH ROUTINE HEALING, SUBSEQUENT ENCOUNTER: Primary | ICD-10-CM

## 2022-05-25 PROCEDURE — 99024 POSTOP FOLLOW-UP VISIT: CPT | Performed by: ORTHOPAEDIC SURGERY

## 2022-05-25 NOTE — PROGRESS NOTES
[address]  [address] [facility] [address]  Dept: [phone]  Dept Fax: [phone]        Orthopaedic Clinic Follow Up      Subjective:   Date of Surgery: 4/14/22    Izaiah Roberson is a 39y.o. year old male who presents to the clinic today for routine follow up 6 weeks status post left femur IM nail. He is doing well without complication. He is currently incarcerated and states compliance with toe-touch weightbearing of 20%. Denies any further trauma or injury. He continues to complain of shooting pain down the left groin and medial thigh. He has been working with physical therapy and continues to progress. He is taking Motrin for pain relief. Review of Systems  Gen: No fever, chills, malaise  CV: No chest pain or palpitations  Resp: No cough or shortness of breath  GI: No nausea, vomiting, diarrhea, or constipation  Neuro: No seizures, vertigo, or headache  Msk: left hip pain  10 remaining systems reviewed and negative    Objective : There were no vitals filed for this visit. Body mass index is 30.41 kg/m². General: No acute distress, resting comfortably in the clinic  Neuro: Alert. oriented  Eyes: Extra-ocular muscles intact  Pulm: Respirations unlabored and regular. Skin: Warm, well perfused  Psych:   Patient has good fund of knowledge and displays understanding of exam, diagnosis, and plan. MSK:  LLE: Skin incisions to the lateral hip and knee are well-healed. There is no sign of drainage, erythema, or infection. 's palpation over the incision sites on the lateral hip. Tolerates logroll with no pain. Tolerates active hip flexion with minimal pain. Stable to varus valgus testing at 0 and 35 degrees. Negative anterior drawer test.  Mild tenderness palpation over the quadriceps tendon. Compartments soft. EHL/FHL/TA/GSC motor intact with 5 out of 5 strength. 4-5 strength to hip flexion.   Sensation intact to sural/saph/SPN/DPN distribution. DP/PT pulses 2+. Radiology:  History: Left subtrochanteric femur fracture status post IM nail    Comparison: 4/27/2022    Findings: 2 views of the left femur (AP/lateral) in a skeletally mature individual demonstrating subtrochanteric femur fracture status post IM nail. There is no complication with the hardware. No breaking or backing out of the screws. There is stable with no further displacement of his fracture. There is callus formation along the medial aspect of the fracture site demonstrating healing. No lytic or blastic lesions noted. No dislocations noted. Impression: Left sub-trochanteric femur fracture status post IM nail with interval healing     Assessment:   39y.o. year old male with left subtrochanteric femur fracture status post IM nail on 4/14/22  Plan:     -Continues to improve and do well clinically  -We will progress him to full weightbearing left lower extremity  -He is currently using a walker. We encouraged him to continue physical therapy  -Motrin as needed for pain relief  -Follow-up in 4 weeks for repeat x-rays of the left femur      Electronically signed by Didi Perry DO PGY-1 on 5/25/2022 at 8:58 AM    This note is created with the assistance of a speech recognition program.  While intending to generate a document that actually reflects the content of the visit, the document can still have some errors including those of syntax and sound a like substitutions which may escape proof reading.   In such instances, actual meaning can be extrapolated by contextual diversion

## 2023-03-18 NOTE — CARE COORDINATION
Incision made with blade Transition planning  Plan is for patient to return to the [facility], needs Daptomycin IV daily for 14 days. Called and left VM for Orquidea in medical services at the [facility] [phone] to discuss time for patient to come to infusion center daily, requested call back. [phone] spoke with Anaid on 3C, patient set up for infusion daily at 3:00 pm, if [facility] needs to change time they can contact infusion center at [phone]. Updated pt's RN.  Faxed face sheet and script to infusion center at [phone].  1600 Left VM for Orquidea at Medical services at the [facility] sn[facility] to inform her of 3:00 pm appt time at the infusion center on 3C, appt times on AVS.

## (undated) DEVICE — CUFF REPROC TRNQT DPSB W/PLC RED 18IN

## (undated) DEVICE — BANDAGE COMPR W6INXL15YD WHT BGE POLY COT WV E HK LOOP CLSR

## (undated) DEVICE — 6619 2 PTNT ISO SYS INCISE AREA&LT;(&GT;&&LT;)&GT;P: Brand: STERI-DRAPE™ IOBAN™ 2

## (undated) DEVICE — COLLECTOR SPEC RAYON LIQ STUART DBL FOR THRT VAG SKIN WND

## (undated) DEVICE — INTENDED FOR TISSUE SEPARATION, AND OTHER PROCEDURES THAT REQUIRE A SHARP SURGICAL BLADE TO PUNCTURE OR CUT.: Brand: BARD-PARKER ® CARBON RIB-BACK BLADES

## (undated) DEVICE — BANDAGE,GAUZE,BULKEE II,4.5"X4.1YD,STRL: Brand: MEDLINE

## (undated) DEVICE — GOWN,AURORA,NONREINFORCED,LARGE: Brand: MEDLINE

## (undated) DEVICE — Device

## (undated) DEVICE — BNDG,ELSTC,MATRIX,STRL,6"X5YD,LF,HOOK&LP: Brand: MEDLINE

## (undated) DEVICE — GAUZE,SPONGE,FLUFF,6"X6.75",STRL,5/TRAY: Brand: MEDLINE

## (undated) DEVICE — PAD,NON-ADHERENT,3X8,STERILE,LF,1/PK: Brand: MEDLINE

## (undated) DEVICE — SUTURE VCRL SZ 2-0 L27IN ABSRB UD L22MM X-1 1/2 CIR REV CUT J459H

## (undated) DEVICE — GLOVE ORANGE PI 7   MSG9070

## (undated) DEVICE — GOWN,SIRUS,NONRNF,SETINSLV,XL,20/CS: Brand: MEDLINE

## (undated) DEVICE — SOLUTION SCRB 4OZ 4% CHG H2O AIDED FOR PREOPERATIVE SKIN

## (undated) DEVICE — DRESSING FOAM W4XL4IN AG SIL FACE BORD IONIC ANTIMIC ADH

## (undated) DEVICE — ZIMMER® STERILE DISPOSABLE TOURNIQUET CUFF WITH PROTECTIVE SLEEVE AND PLC, DUAL PORT, SINGLE BLADDER, 24 IN. (61 CM)

## (undated) DEVICE — GUIDEWIRE ORTH L400MM DIA3.2MM FOR TFN

## (undated) DEVICE — GLOVE ORANGE PI 8 1/2   MSG9085

## (undated) DEVICE — BIT DRL L266MM DIA16MM CANN L QUIK CPL FOR PROX FEM NAILING

## (undated) DEVICE — GARMENT,MEDLINE,DVT,INT,CALF,MED, GEN2: Brand: MEDLINE

## (undated) DEVICE — GLOVE SURG SZ 65 THK91MIL LTX FREE SYN POLYISOPRENE

## (undated) DEVICE — GLOVE ORANGE PI 7 1/2   MSG9075

## (undated) DEVICE — C-ARMOR C-ARM EQUIPMENT COVERS CLEAR STERILE UNIVERSAL FIT 12 PER CASE: Brand: C-ARMOR

## (undated) DEVICE — SUTURE VCRL SZ 3-0 L27IN ABSRB UD L26MM SH 1/2 CIR J416H

## (undated) DEVICE — DRESSING,GAUZE,XEROFORM,CURAD,1"X8",ST: Brand: CURAD

## (undated) DEVICE — GLOVE ORANGE PI 8   MSG9080

## (undated) DEVICE — SYRINGE, LUER LOCK, 10ML: Brand: MEDLINE

## (undated) DEVICE — SOLUTION IV IRRIG POUR BRL 0.9% SODIUM CHL 2F7124

## (undated) DEVICE — SUTURE ETHLN SZ 3-0 L18IN NONABSORBABLE BLK L24MM PS-1 3/8 1663G

## (undated) DEVICE — INTENDED TO SUPPORT AND MAINTAIN THE POSITION OF AN ANESTHETIZED PATIENT DURING SURGERY: Brand: MD TRACTION ROPE

## (undated) DEVICE — ROD RMR L950MM DIA2.5MM W/ EXTN BALL TIP

## (undated) DEVICE — DRESSING TRNSPAR W5XL4.5IN FLM SHT SEMIPERMEABLE WIND

## (undated) DEVICE — SUTURE NONABSORBABLE MONOFILAMENT 3-0 PS-1 18 IN BLK ETHILON 1663H

## (undated) DEVICE — BIT DRL L145MM DIA4.2MM NONSTERILE 3 FLUT NDL PNT QUIK CPL

## (undated) DEVICE — ELECTRODE PT RET AD L9FT HI MOIST COND ADH HYDRGEL CORDED

## (undated) DEVICE — SVMMC POD PK

## (undated) DEVICE — SUTURE ETHLN SZ 4-0 L18IN NONABSORBABLE BLK L19MM PS-2 3/8 1667H

## (undated) DEVICE — Z DISCONTINUED BY MEDLINE USE 2711682 TRAY SKIN PREP DRY W/ PREM GLV

## (undated) DEVICE — NEEDLE BX 11GA L101MM BNE MAR ASPIR W/ ADPT JAMSH

## (undated) DEVICE — SWAB CULT CLR BLU PLAS RAYON LIQ AMIES AERB ANAERB FRIC CAP

## (undated) DEVICE — SUTURE MCRYL SZ 3-0 L27IN ABSRB UD L24MM PS-1 3/8 CIR PRIM Y936H

## (undated) DEVICE — SYRINGE IRRIG 60ML SFT PLIABLE BLB EZ TO GRP 1 HND USE W/

## (undated) DEVICE — DRAPE,REIN 53X77,STERILE: Brand: MEDLINE

## (undated) DEVICE — BIT DRL L500MM DIA6X9MM CANN STP L QUIK CPL FOR DH DC TFN

## (undated) DEVICE — SVMMC ORTH SPL DRP PK

## (undated) DEVICE — GLOVE ORTHO 8   MSG9480

## (undated) DEVICE — SUTURE VCRL SZ 0 L27IN ABSRB UD L26MM CT-2 1/2 CIR J270H

## (undated) DEVICE — CONTAINER,SPECIMEN,4OZ,OR STRL: Brand: MEDLINE

## (undated) DEVICE — 3M™ IOBAN™ 2 ANTIMICROBIAL INCISE DRAPE 6650EZ: Brand: IOBAN™ 2

## (undated) DEVICE — APPLICATOR MEDICATED 26 CC SOLUTION HI LT ORNG CHLORAPREP